# Patient Record
Sex: FEMALE | Race: BLACK OR AFRICAN AMERICAN
[De-identification: names, ages, dates, MRNs, and addresses within clinical notes are randomized per-mention and may not be internally consistent; named-entity substitution may affect disease eponyms.]

---

## 2017-03-21 ENCOUNTER — APPOINTMENT (OUTPATIENT)
Dept: FAMILY MEDICINE | Facility: CLINIC | Age: 57
End: 2017-03-21

## 2017-03-21 VITALS
BODY MASS INDEX: 26.93 KG/M2 | WEIGHT: 152 LBS | HEIGHT: 63 IN | SYSTOLIC BLOOD PRESSURE: 120 MMHG | DIASTOLIC BLOOD PRESSURE: 80 MMHG

## 2017-03-21 RX ORDER — SODIUM PICOSULFATE, MAGNESIUM OXIDE, AND ANHYDROUS CITRIC ACID 10; 3.5; 12 MG/16.2G; G/16.2G; G/16.2G
10-3.5-12 POWDER, METERED ORAL
Qty: 2 | Refills: 0 | Status: DISCONTINUED | COMMUNITY
Start: 2017-02-10

## 2017-03-21 RX ORDER — RIFAXIMIN 550 MG/1
550 TABLET ORAL
Qty: 30 | Refills: 0 | Status: DISCONTINUED | COMMUNITY
Start: 2017-02-10

## 2017-03-29 ENCOUNTER — MEDICATION RENEWAL (OUTPATIENT)
Age: 57
End: 2017-03-29

## 2017-05-15 ENCOUNTER — MEDICATION RENEWAL (OUTPATIENT)
Age: 57
End: 2017-05-15

## 2017-05-22 ENCOUNTER — MEDICATION RENEWAL (OUTPATIENT)
Age: 57
End: 2017-05-22

## 2017-10-31 ENCOUNTER — RX RENEWAL (OUTPATIENT)
Age: 57
End: 2017-10-31

## 2017-12-06 ENCOUNTER — APPOINTMENT (OUTPATIENT)
Dept: FAMILY MEDICINE | Facility: CLINIC | Age: 57
End: 2017-12-06
Payer: COMMERCIAL

## 2017-12-06 ENCOUNTER — LABORATORY RESULT (OUTPATIENT)
Age: 57
End: 2017-12-06

## 2017-12-06 VITALS
DIASTOLIC BLOOD PRESSURE: 80 MMHG | BODY MASS INDEX: 28.35 KG/M2 | WEIGHT: 160 LBS | SYSTOLIC BLOOD PRESSURE: 128 MMHG | HEIGHT: 63 IN

## 2017-12-06 PROCEDURE — 99213 OFFICE O/P EST LOW 20 MIN: CPT | Mod: 25

## 2017-12-06 PROCEDURE — 36415 COLL VENOUS BLD VENIPUNCTURE: CPT

## 2017-12-06 RX ORDER — AZITHROMYCIN 250 MG/1
250 TABLET, FILM COATED ORAL
Qty: 1 | Refills: 0 | Status: DISCONTINUED | COMMUNITY
Start: 2017-03-21 | End: 2017-12-06

## 2017-12-06 RX ORDER — PREDNISONE 20 MG/1
20 TABLET ORAL
Qty: 12 | Refills: 0 | Status: DISCONTINUED | COMMUNITY
Start: 2017-03-21 | End: 2017-12-06

## 2017-12-11 LAB
ALBUMIN SERPL ELPH-MCNC: 4 G/DL
ALP BLD-CCNC: 88 U/L
ALT SERPL-CCNC: 14 U/L
ANION GAP SERPL CALC-SCNC: 17 MMOL/L
AST SERPL-CCNC: 13 U/L
BASOPHILS # BLD AUTO: 0.05 K/UL
BASOPHILS NFR BLD AUTO: 0.9 %
BILIRUB SERPL-MCNC: 0.6 MG/DL
BUN SERPL-MCNC: 14 MG/DL
CALCIUM SERPL-MCNC: 10.2 MG/DL
CHLORIDE SERPL-SCNC: 101 MMOL/L
CO2 SERPL-SCNC: 25 MMOL/L
CREAT SERPL-MCNC: 0.9 MG/DL
EOSINOPHIL # BLD AUTO: 0.35 K/UL
EOSINOPHIL NFR BLD AUTO: 6.5 %
GLUCOSE SERPL-MCNC: 156 MG/DL
HCT VFR BLD CALC: 40.7 %
HGB BLD-MCNC: 13.7 G/DL
IMM GRANULOCYTES NFR BLD AUTO: 0 %
LYMPHOCYTES # BLD AUTO: 1.95 K/UL
LYMPHOCYTES NFR BLD AUTO: 36.2 %
MAN DIFF?: NORMAL
MCHC RBC-ENTMCNC: 30.4 PG
MCHC RBC-ENTMCNC: 33.7 GM/DL
MCV RBC AUTO: 90.4 FL
MONOCYTES # BLD AUTO: 0.24 K/UL
MONOCYTES NFR BLD AUTO: 4.5 %
NEUTROPHILS # BLD AUTO: 2.8 K/UL
NEUTROPHILS NFR BLD AUTO: 51.9 %
PLATELET # BLD AUTO: 268 K/UL
POTASSIUM SERPL-SCNC: 3.7 MMOL/L
PROT SERPL-MCNC: 7.5 G/DL
RBC # BLD: 4.5 M/UL
RBC # FLD: 12.9 %
SODIUM SERPL-SCNC: 143 MMOL/L
T3FREE SERPL-MCNC: 2.97 PG/ML
T4 FREE SERPL-MCNC: 1.2 NG/DL
TSH SERPL-ACNC: 1.23 UIU/ML
WBC # FLD AUTO: 5.39 K/UL

## 2017-12-29 ENCOUNTER — MEDICATION RENEWAL (OUTPATIENT)
Age: 57
End: 2017-12-29

## 2018-06-11 ENCOUNTER — APPOINTMENT (OUTPATIENT)
Dept: FAMILY MEDICINE | Facility: CLINIC | Age: 58
End: 2018-06-11
Payer: COMMERCIAL

## 2018-06-11 VITALS
HEART RATE: 79 BPM | WEIGHT: 161.44 LBS | TEMPERATURE: 98 F | HEIGHT: 63 IN | DIASTOLIC BLOOD PRESSURE: 80 MMHG | OXYGEN SATURATION: 96 % | SYSTOLIC BLOOD PRESSURE: 130 MMHG | BODY MASS INDEX: 28.61 KG/M2

## 2018-06-11 DIAGNOSIS — Z86.39 PERSONAL HISTORY OF OTHER ENDOCRINE, NUTRITIONAL AND METABOLIC DISEASE: ICD-10-CM

## 2018-06-11 PROCEDURE — 99214 OFFICE O/P EST MOD 30 MIN: CPT | Mod: 25

## 2018-06-11 PROCEDURE — 36415 COLL VENOUS BLD VENIPUNCTURE: CPT

## 2018-06-11 NOTE — PHYSICAL EXAM
[Well Nourished] : well nourished [Supple] : supple [Clear to Auscultation] : lungs were clear to auscultation bilaterally [Regular Rhythm] : with a regular rhythm [No Abdominal Bruit] : a ~M bruit was not heard ~T in the abdomen [Non Tender] : non-tender [No Joint Swelling] : no joint swelling [No Rash] : no rash [Normal Gait] : normal gait [Normal Insight/Judgement] : insight and judgment were intact

## 2018-06-11 NOTE — HISTORY OF PRESENT ILLNESS
[FreeTextEntry1] : fu [de-identified] : 58 year old female is here for a followup visit. Patient is here for medication renewals and for blood work discussion. Medications and allergies were reviewed and assessed.  There has been no new medications since the last visit. Patient is feeling well with no active changes or issues since Her last visit.\par

## 2018-06-11 NOTE — HEALTH RISK ASSESSMENT
[] : No [No falls in past year] : Patient reported no falls in the past year [0] : 2) Feeling down, depressed, or hopeless: Not at all (0) [[id number]] : 0

## 2018-06-11 NOTE — ASSESSMENT
[FreeTextEntry1] : The patient has a diagnosis of hypertension. Blood work was drawn in office and will be followed.  The diagnosis was discussed with patient and need for medication compliance and possible side affects and risks of noncompliance. Patient was told to adhere to a low salt diet and try to incorporate exercise daily.\par \par Patient had a diagnosis of thyroid disorder. Blood was drawn to assess levels of TSH and T4. Patient will continue on current dose of medications at this time unless instructed otherwise. Patient was advised to take thyroid medications on a empty stomach and to wait at least 45 minutes before eating for appropriate absorption.\par

## 2018-06-12 PROBLEM — Z86.39 HISTORY OF HIGH CHOLESTEROL: Status: RESOLVED | Noted: 2017-05-15 | Resolved: 2018-06-12

## 2018-06-12 LAB
ALBUMIN SERPL ELPH-MCNC: 4.4 G/DL
ALP BLD-CCNC: 88 U/L
ALT SERPL-CCNC: 18 U/L
ANION GAP SERPL CALC-SCNC: 16 MMOL/L
AST SERPL-CCNC: 17 U/L
BASOPHILS # BLD AUTO: 0.02 K/UL
BASOPHILS NFR BLD AUTO: 0.3 %
BILIRUB SERPL-MCNC: 0.8 MG/DL
BUN SERPL-MCNC: 14 MG/DL
CALCIUM SERPL-MCNC: 10.1 MG/DL
CHLORIDE SERPL-SCNC: 98 MMOL/L
CHOLEST SERPL-MCNC: 296 MG/DL
CHOLEST/HDLC SERPL: 3.1 RATIO
CO2 SERPL-SCNC: 27 MMOL/L
CREAT SERPL-MCNC: 1 MG/DL
EOSINOPHIL # BLD AUTO: 0.19 K/UL
EOSINOPHIL NFR BLD AUTO: 3.3 %
ESTRADIOL SERPL-MCNC: <5 PG/ML
FSH SERPL-MCNC: 60.4 IU/L
GLUCOSE SERPL-MCNC: 110 MG/DL
HBA1C MFR BLD HPLC: 5 %
HCT VFR BLD CALC: 42.4 %
HDLC SERPL-MCNC: 97 MG/DL
HGB BLD-MCNC: 13.8 G/DL
IMM GRANULOCYTES NFR BLD AUTO: 0.2 %
LDLC SERPL CALC-MCNC: 165 MG/DL
LYMPHOCYTES # BLD AUTO: 2.24 K/UL
LYMPHOCYTES NFR BLD AUTO: 39.1 %
MAN DIFF?: NORMAL
MCHC RBC-ENTMCNC: 30.3 PG
MCHC RBC-ENTMCNC: 32.5 GM/DL
MCV RBC AUTO: 93 FL
MONOCYTES # BLD AUTO: 0.29 K/UL
MONOCYTES NFR BLD AUTO: 5.1 %
NEUTROPHILS # BLD AUTO: 2.98 K/UL
NEUTROPHILS NFR BLD AUTO: 52 %
PLATELET # BLD AUTO: 278 K/UL
POTASSIUM SERPL-SCNC: 4 MMOL/L
PROT SERPL-MCNC: 7.7 G/DL
RBC # BLD: 4.56 M/UL
RBC # FLD: 13.4 %
SODIUM SERPL-SCNC: 141 MMOL/L
T4 FREE SERPL-MCNC: 1.2 NG/DL
TRIGL SERPL-MCNC: 172 MG/DL
TSH SERPL-ACNC: 1.52 UIU/ML
WBC # FLD AUTO: 5.73 K/UL

## 2018-09-30 ENCOUNTER — APPOINTMENT (OUTPATIENT)
Dept: FAMILY MEDICINE | Facility: CLINIC | Age: 58
End: 2018-09-30
Payer: COMMERCIAL

## 2018-09-30 VITALS
WEIGHT: 164 LBS | HEART RATE: 84 BPM | SYSTOLIC BLOOD PRESSURE: 122 MMHG | BODY MASS INDEX: 29.06 KG/M2 | DIASTOLIC BLOOD PRESSURE: 80 MMHG | OXYGEN SATURATION: 94 % | HEIGHT: 63 IN

## 2018-09-30 PROCEDURE — 99213 OFFICE O/P EST LOW 20 MIN: CPT

## 2018-10-30 ENCOUNTER — APPOINTMENT (OUTPATIENT)
Dept: FAMILY MEDICINE | Facility: CLINIC | Age: 58
End: 2018-10-30
Payer: COMMERCIAL

## 2018-10-30 VITALS
BODY MASS INDEX: 29.23 KG/M2 | HEIGHT: 63 IN | WEIGHT: 165 LBS | HEART RATE: 61 BPM | OXYGEN SATURATION: 98 % | DIASTOLIC BLOOD PRESSURE: 82 MMHG | RESPIRATION RATE: 16 BRPM | SYSTOLIC BLOOD PRESSURE: 136 MMHG

## 2018-10-30 DIAGNOSIS — J06.9 ACUTE UPPER RESPIRATORY INFECTION, UNSPECIFIED: ICD-10-CM

## 2018-10-30 PROCEDURE — 99213 OFFICE O/P EST LOW 20 MIN: CPT

## 2018-10-30 NOTE — PHYSICAL EXAM
[Well Nourished] : well nourished [Supple] : supple [Clear to Auscultation] : lungs were clear to auscultation bilaterally [Regular Rhythm] : with a regular rhythm [No Abdominal Bruit] : a ~M bruit was not heard ~T in the abdomen [Non Tender] : non-tender [No Joint Swelling] : no joint swelling [No Rash] : no rash [Normal Gait] : normal gait [Normal Insight/Judgement] : insight and judgment were intact [de-identified] : c

## 2018-10-30 NOTE — HISTORY OF PRESENT ILLNESS
[FreeTextEntry8] : 58 year old female here with complaints of low grade fever that stopped two days ago, now has uncontrollable coughing.  has been sick for a week. Patients active medications, allergies and issues were all reviewed with the patient at time of visit.\par

## 2018-10-30 NOTE — HEALTH RISK ASSESSMENT
[] : No [No falls in past year] : Patient reported no falls in the past year [0] : 2) Feeling down, depressed, or hopeless: Not at all (0) [[id number]] : 0

## 2018-12-11 ENCOUNTER — RX RENEWAL (OUTPATIENT)
Age: 58
End: 2018-12-11

## 2019-01-17 ENCOUNTER — RX RENEWAL (OUTPATIENT)
Age: 59
End: 2019-01-17

## 2019-02-15 ENCOUNTER — APPOINTMENT (OUTPATIENT)
Dept: FAMILY MEDICINE | Facility: CLINIC | Age: 59
End: 2019-02-15
Payer: COMMERCIAL

## 2019-02-15 VITALS
HEART RATE: 55 BPM | DIASTOLIC BLOOD PRESSURE: 80 MMHG | TEMPERATURE: 98.1 F | WEIGHT: 162.5 LBS | HEIGHT: 63 IN | BODY MASS INDEX: 28.79 KG/M2 | SYSTOLIC BLOOD PRESSURE: 130 MMHG

## 2019-02-15 PROCEDURE — 99214 OFFICE O/P EST MOD 30 MIN: CPT | Mod: 25

## 2019-02-15 PROCEDURE — 36415 COLL VENOUS BLD VENIPUNCTURE: CPT

## 2019-02-15 RX ORDER — PROMETHAZINE HYDROCHLORIDE AND DEXTROMETHORPHAN HYDROBROMIDE ORAL SOLUTION 15; 6.25 MG/5ML; MG/5ML
6.25-15 SOLUTION ORAL
Qty: 150 | Refills: 0 | Status: DISCONTINUED | COMMUNITY
Start: 2018-10-30 | End: 2019-02-15

## 2019-02-15 RX ORDER — PREDNISONE 20 MG/1
20 TABLET ORAL
Qty: 15 | Refills: 0 | Status: DISCONTINUED | COMMUNITY
Start: 2018-10-30 | End: 2019-02-15

## 2019-02-15 RX ORDER — ATORVASTATIN CALCIUM 10 MG/1
10 TABLET, FILM COATED ORAL
Qty: 90 | Refills: 0 | Status: DISCONTINUED | COMMUNITY
Start: 2018-06-12 | End: 2019-02-15

## 2019-02-15 NOTE — HISTORY OF PRESENT ILLNESS
[FreeTextEntry1] : fu [de-identified] : 58 year old female is here for a followup visit. Patient is here for medication renewals and for blood work discussion. Medications and allergies were reviewed and assessed.  There has been no new medications since the last visit. Patient is feeling well with no active changes or issues since Her last visit.\par patient wishes to have a discussion about her blood pressure

## 2019-02-15 NOTE — HEALTH RISK ASSESSMENT
[] : No [No falls in past year] : Patient reported no falls in the past year [0] : 2) Feeling down, depressed, or hopeless: Not at all (0) [[id number]] : 0

## 2019-02-15 NOTE — ASSESSMENT
[FreeTextEntry1] : The patient has a diagnosis of hypertension. Blood work was drawn in office and will be followed.  The diagnosis was discussed with patient and need for medication compliance and possible side affects and risks of noncompliance. Patient was told to adhere to a low salt diet and try to incorporate exercise daily.\par \par Patient had a diagnosis of thyroid disorder. Blood was drawn to assess levels of TSH and T4. Patient will continue on current dose of medications at this time unless instructed otherwise. Patient was advised to take thyroid medications on a empty stomach and to wait at least 45 minutes before eating for appropriate absorption.\par \par The diagnosis of high cholesterol is established and patient is currently taking medications. Blood work was drawn in office and results will be reviewed and followed. The patient was counseled on a low cholesterol diet and on medication compliance. Patient was advised to generally limit foods fried in oil, high in saturated fat, cheese, eggs and red meat. Patient was advised to continue on current medications and to followup in office for necessary blood work in three months.\par patient failed atorvastatin - feels bad, loss of hair, body aches, switch to crestor\par \par

## 2019-02-18 ENCOUNTER — MEDICATION RENEWAL (OUTPATIENT)
Age: 59
End: 2019-02-18

## 2019-02-19 ENCOUNTER — MEDICATION RENEWAL (OUTPATIENT)
Age: 59
End: 2019-02-19

## 2019-02-25 ENCOUNTER — MEDICATION RENEWAL (OUTPATIENT)
Age: 59
End: 2019-02-25

## 2019-06-30 ENCOUNTER — APPOINTMENT (OUTPATIENT)
Dept: FAMILY MEDICINE | Facility: CLINIC | Age: 59
End: 2019-06-30
Payer: COMMERCIAL

## 2019-06-30 VITALS
BODY MASS INDEX: 28.17 KG/M2 | WEIGHT: 159 LBS | HEIGHT: 63 IN | SYSTOLIC BLOOD PRESSURE: 138 MMHG | DIASTOLIC BLOOD PRESSURE: 82 MMHG

## 2019-06-30 PROCEDURE — 93000 ELECTROCARDIOGRAM COMPLETE: CPT

## 2019-06-30 PROCEDURE — 99213 OFFICE O/P EST LOW 20 MIN: CPT | Mod: 25

## 2019-07-02 ENCOUNTER — APPOINTMENT (OUTPATIENT)
Dept: CARDIOLOGY | Facility: CLINIC | Age: 59
End: 2019-07-02

## 2019-08-28 ENCOUNTER — APPOINTMENT (OUTPATIENT)
Dept: FAMILY MEDICINE | Facility: CLINIC | Age: 59
End: 2019-08-28
Payer: COMMERCIAL

## 2019-08-28 VITALS — BODY MASS INDEX: 28.59 KG/M2 | WEIGHT: 161.4 LBS

## 2019-08-28 VITALS — DIASTOLIC BLOOD PRESSURE: 78 MMHG | HEIGHT: 63 IN | SYSTOLIC BLOOD PRESSURE: 132 MMHG

## 2019-08-28 PROCEDURE — 99214 OFFICE O/P EST MOD 30 MIN: CPT | Mod: 25

## 2019-08-28 PROCEDURE — 36415 COLL VENOUS BLD VENIPUNCTURE: CPT

## 2019-08-28 RX ORDER — ALBUTEROL SULFATE 90 UG/1
108 (90 BASE) AEROSOL, METERED RESPIRATORY (INHALATION)
Qty: 3 | Refills: 0 | Status: DISCONTINUED | COMMUNITY
Start: 2019-02-18 | End: 2019-08-28

## 2019-08-28 RX ORDER — METRONIDAZOLE 500 MG/1
500 TABLET ORAL
Qty: 42 | Refills: 0 | Status: DISCONTINUED | COMMUNITY
Start: 2019-07-26

## 2019-08-28 RX ORDER — CIPROFLOXACIN HYDROCHLORIDE 500 MG/1
500 TABLET, FILM COATED ORAL
Qty: 28 | Refills: 0 | Status: DISCONTINUED | COMMUNITY
Start: 2019-07-26

## 2019-08-28 RX ORDER — BUDESONIDE 180 UG/1
180 AEROSOL, POWDER RESPIRATORY (INHALATION)
Qty: 3 | Refills: 0 | Status: DISCONTINUED | COMMUNITY
Start: 2019-02-25 | End: 2019-08-28

## 2019-08-28 NOTE — PHYSICAL EXAM
[Normal Oropharynx] : the oropharynx was normal [No Lymphadenopathy] : no lymphadenopathy [Supple] : supple [Thyroid Normal, No Nodules] : the thyroid was normal and there were no nodules present [No Edema] : there was no peripheral edema [No Extremity Clubbing/Cyanosis] : no extremity clubbing/cyanosis [Normal] : affect was normal and insight and judgment were intact

## 2019-08-28 NOTE — PLAN
[FreeTextEntry1] : Will follow up labwork drawn in office today.\par \par Patient has GI appt and will be making Cardiology appointment. Asked to have notes from specialists sent to our office. \par \par \par

## 2019-08-28 NOTE — HISTORY OF PRESENT ILLNESS
[FreeTextEntry1] : Here for follow-up, HTN, HLD [de-identified] : Here for follow-up, HTN, HLD.\par \par Dr. Hood-Gastro for stomach issues.  Xifaxan; bad reaction to Cipro/Flagyl. \par Follow-up with Gastro next week.  Bad diarrhea last week.  \par \par Cardio-needs to make appointment. \par \par Takes claritin as needed.\par Medications and allergies reviewed.\par \par \par \par

## 2019-08-29 LAB
ALBUMIN SERPL ELPH-MCNC: 4.3 G/DL
ALP BLD-CCNC: 89 U/L
ALT SERPL-CCNC: 23 U/L
ANION GAP SERPL CALC-SCNC: 13 MMOL/L
AST SERPL-CCNC: 21 U/L
BASOPHILS # BLD AUTO: 0.07 K/UL
BASOPHILS NFR BLD AUTO: 1 %
BILIRUB SERPL-MCNC: 0.4 MG/DL
BUN SERPL-MCNC: 13 MG/DL
CALCIUM SERPL-MCNC: 9.7 MG/DL
CHLORIDE SERPL-SCNC: 100 MMOL/L
CHOLEST SERPL-MCNC: 220 MG/DL
CHOLEST/HDLC SERPL: 2.7 RATIO
CO2 SERPL-SCNC: 26 MMOL/L
CREAT SERPL-MCNC: 0.7 MG/DL
EOSINOPHIL # BLD AUTO: 0.32 K/UL
EOSINOPHIL NFR BLD AUTO: 4.6 %
ESTIMATED AVERAGE GLUCOSE: 94 MG/DL
GLUCOSE SERPL-MCNC: 107 MG/DL
HBA1C MFR BLD HPLC: 4.9 %
HCT VFR BLD CALC: 41.5 %
HDLC SERPL-MCNC: 81 MG/DL
HGB BLD-MCNC: 13.9 G/DL
IMM GRANULOCYTES NFR BLD AUTO: 0.3 %
LDLC SERPL CALC-MCNC: 118 MG/DL
LYMPHOCYTES # BLD AUTO: 2.84 K/UL
LYMPHOCYTES NFR BLD AUTO: 40.4 %
MAN DIFF?: NORMAL
MCHC RBC-ENTMCNC: 30.5 PG
MCHC RBC-ENTMCNC: 33.5 GM/DL
MCV RBC AUTO: 91 FL
MONOCYTES # BLD AUTO: 0.49 K/UL
MONOCYTES NFR BLD AUTO: 7 %
NEUTROPHILS # BLD AUTO: 3.29 K/UL
NEUTROPHILS NFR BLD AUTO: 46.7 %
PLATELET # BLD AUTO: 247 K/UL
POTASSIUM SERPL-SCNC: 3.5 MMOL/L
PROT SERPL-MCNC: 7.3 G/DL
RBC # BLD: 4.56 M/UL
RBC # FLD: 12.1 %
SODIUM SERPL-SCNC: 139 MMOL/L
TRIGL SERPL-MCNC: 103 MG/DL
TSH SERPL-ACNC: 1.48 UIU/ML
WBC # FLD AUTO: 7.03 K/UL

## 2019-11-04 ENCOUNTER — RESULT REVIEW (OUTPATIENT)
Age: 59
End: 2019-11-04

## 2020-02-03 ENCOUNTER — RX RENEWAL (OUTPATIENT)
Age: 60
End: 2020-02-03

## 2020-03-13 ENCOUNTER — APPOINTMENT (OUTPATIENT)
Dept: FAMILY MEDICINE | Facility: CLINIC | Age: 60
End: 2020-03-13
Payer: COMMERCIAL

## 2020-03-13 VITALS — SYSTOLIC BLOOD PRESSURE: 128 MMHG | DIASTOLIC BLOOD PRESSURE: 84 MMHG

## 2020-03-13 VITALS
DIASTOLIC BLOOD PRESSURE: 90 MMHG | HEIGHT: 63 IN | HEART RATE: 58 BPM | SYSTOLIC BLOOD PRESSURE: 126 MMHG | OXYGEN SATURATION: 97 % | TEMPERATURE: 97.6 F

## 2020-03-13 LAB
ALBUMIN SERPL ELPH-MCNC: 4.4 G/DL
ALP BLD-CCNC: 86 U/L
ALT SERPL-CCNC: 21 U/L
ANION GAP SERPL CALC-SCNC: 14 MMOL/L
AST SERPL-CCNC: 16 U/L
BASOPHILS # BLD AUTO: 0.04 K/UL
BASOPHILS NFR BLD AUTO: 0.6 %
BILIRUB SERPL-MCNC: 0.6 MG/DL
BUN SERPL-MCNC: 16 MG/DL
CALCIUM SERPL-MCNC: 9.9 MG/DL
CHLORIDE SERPL-SCNC: 100 MMOL/L
CHOLEST SERPL-MCNC: 270 MG/DL
CHOLEST/HDLC SERPL: 2.8 RATIO
CO2 SERPL-SCNC: 29 MMOL/L
CREAT SERPL-MCNC: 0.75 MG/DL
EOSINOPHIL # BLD AUTO: 0.15 K/UL
EOSINOPHIL NFR BLD AUTO: 2.3 %
ESTIMATED AVERAGE GLUCOSE: 100 MG/DL
GLUCOSE SERPL-MCNC: 114 MG/DL
HBA1C MFR BLD HPLC: 5.1 %
HCT VFR BLD CALC: 43 %
HDLC SERPL-MCNC: 96 MG/DL
HGB BLD-MCNC: 13.7 G/DL
IMM GRANULOCYTES NFR BLD AUTO: 0.3 %
LDLC SERPL CALC-MCNC: 151 MG/DL
LYMPHOCYTES # BLD AUTO: 2.25 K/UL
LYMPHOCYTES NFR BLD AUTO: 35.2 %
MAN DIFF?: NORMAL
MCHC RBC-ENTMCNC: 29.4 PG
MCHC RBC-ENTMCNC: 31.9 GM/DL
MCV RBC AUTO: 92.3 FL
MONOCYTES # BLD AUTO: 0.41 K/UL
MONOCYTES NFR BLD AUTO: 6.4 %
NEUTROPHILS # BLD AUTO: 3.53 K/UL
NEUTROPHILS NFR BLD AUTO: 55.2 %
PLATELET # BLD AUTO: 284 K/UL
POTASSIUM SERPL-SCNC: 3.3 MMOL/L
PROT SERPL-MCNC: 7.3 G/DL
RBC # BLD: 4.66 M/UL
RBC # FLD: 12.6 %
SODIUM SERPL-SCNC: 142 MMOL/L
TRIGL SERPL-MCNC: 111 MG/DL
TSH SERPL-ACNC: 0.88 UIU/ML
WBC # FLD AUTO: 6.4 K/UL

## 2020-03-13 PROCEDURE — 99214 OFFICE O/P EST MOD 30 MIN: CPT | Mod: 25

## 2020-03-13 PROCEDURE — 36415 COLL VENOUS BLD VENIPUNCTURE: CPT

## 2020-03-13 NOTE — PHYSICAL EXAM
[Normal Oropharynx] : the oropharynx was normal [No Edema] : there was no peripheral edema [No Extremity Clubbing/Cyanosis] : no extremity clubbing/cyanosis [Normal] : affect was normal and insight and judgment were intact [de-identified] : Nontender-no ecchymosis, no erythema.  ROM intact.  Dorsflexion and plantar flexion intact b/l feet.  Bunions.

## 2020-03-13 NOTE — PLAN
[FreeTextEntry1] : Will follow up labwork drawn in office today.\par \par Restart Singulair. \par Will send for Xray to evaluate foot pain, no redness no bruising. \par Podiatry for further follow-up. \par Advised any worsening pain or symptoms requires medical re-evaluation.

## 2020-03-13 NOTE — HISTORY OF PRESENT ILLNESS
[FreeTextEntry1] : Here for follow-up [de-identified] : Here for follow-up\par Lactose intolerance-GI Dr. Hood 12/19-[place]\par Gyn-12/19-had mammo and pap smear. Menopause mid 50s. \par \par Needs med refills. \par Has been watching diet. \par \par Can fell on left foot in November-after thanksgiving. No bruising.  Pain with activity some days-did not try anything for pain, no heat no ice. \par Was not evaluated when it happened, has been dealing with it. \par Using rescue inhaler more frequently. Has not been using allergy medication.  \par Usually has worse allergies in the spring. \par

## 2020-03-13 NOTE — REVIEW OF SYSTEMS
[Negative] : Neurological [Wheezing] : no wheezing [Cough] : no cough [FreeTextEntry9] : top of left foot pain

## 2020-05-28 ENCOUNTER — APPOINTMENT (OUTPATIENT)
Dept: FAMILY MEDICINE | Facility: CLINIC | Age: 60
End: 2020-05-28
Payer: COMMERCIAL

## 2020-05-28 ENCOUNTER — NON-APPOINTMENT (OUTPATIENT)
Age: 60
End: 2020-05-28

## 2020-05-28 VITALS
OXYGEN SATURATION: 97 % | SYSTOLIC BLOOD PRESSURE: 126 MMHG | BODY MASS INDEX: 27.46 KG/M2 | HEART RATE: 69 BPM | HEIGHT: 63 IN | WEIGHT: 155 LBS | TEMPERATURE: 97.7 F | DIASTOLIC BLOOD PRESSURE: 76 MMHG

## 2020-05-28 DIAGNOSIS — R53.81 OTHER MALAISE: ICD-10-CM

## 2020-05-28 PROCEDURE — 36415 COLL VENOUS BLD VENIPUNCTURE: CPT

## 2020-05-28 PROCEDURE — 93000 ELECTROCARDIOGRAM COMPLETE: CPT

## 2020-05-28 PROCEDURE — 99214 OFFICE O/P EST MOD 30 MIN: CPT | Mod: 25

## 2020-05-28 NOTE — REVIEW OF SYSTEMS
[Negative] : Psychiatric [Chest Pain] : no chest pain [Palpitations] : no palpitations [FreeTextEntry9] : back pain

## 2020-05-28 NOTE — PLAN
[FreeTextEntry1] : Will follow up labwork drawn in office today.\par \par Can trial anti-inflammatory for muscle tenderness as needed. MSK component with reproducible tenderness.  \par EKG reviewed-sinus trace similar to prior.  Recommending Cardiology eval given ongoing duration of symptoms since last summer. \par \par Discussed with Ms. HI precautions and advised to go to Emergency Room if condition worsened.  MsStef CORTEZMILTON HI expressed understanding of the plan.\par

## 2020-05-28 NOTE — PHYSICAL EXAM
[Normal Oropharynx] : the oropharynx was normal [No Lymphadenopathy] : no lymphadenopathy [Supple] : supple [Normal] : affect was normal and insight and judgment were intact [de-identified] : anterior chest wall tenderness to palpation [de-identified] : upper thoracic paraspinal tenderness

## 2020-05-29 LAB
ALBUMIN SERPL ELPH-MCNC: 4.2 G/DL
ALP BLD-CCNC: 82 U/L
ALT SERPL-CCNC: 25 U/L
ANION GAP SERPL CALC-SCNC: 14 MMOL/L
AST SERPL-CCNC: 18 U/L
BILIRUB SERPL-MCNC: 0.6 MG/DL
BUN SERPL-MCNC: 12 MG/DL
CALCIUM SERPL-MCNC: 9.9 MG/DL
CHLORIDE SERPL-SCNC: 100 MMOL/L
CHOLEST SERPL-MCNC: 182 MG/DL
CHOLEST/HDLC SERPL: 2.1 RATIO
CO2 SERPL-SCNC: 29 MMOL/L
CREAT SERPL-MCNC: 0.75 MG/DL
GLUCOSE SERPL-MCNC: 105 MG/DL
HDLC SERPL-MCNC: 86 MG/DL
LDLC SERPL CALC-MCNC: 71 MG/DL
POTASSIUM SERPL-SCNC: 3.6 MMOL/L
PROT SERPL-MCNC: 7 G/DL
SODIUM SERPL-SCNC: 142 MMOL/L
TRIGL SERPL-MCNC: 127 MG/DL
TSH SERPL-ACNC: 0.99 UIU/ML

## 2020-06-19 ENCOUNTER — APPOINTMENT (OUTPATIENT)
Dept: FAMILY MEDICINE | Facility: CLINIC | Age: 60
End: 2020-06-19

## 2020-11-23 ENCOUNTER — RX RENEWAL (OUTPATIENT)
Age: 60
End: 2020-11-23

## 2020-11-30 ENCOUNTER — APPOINTMENT (OUTPATIENT)
Dept: FAMILY MEDICINE | Facility: CLINIC | Age: 60
End: 2020-11-30
Payer: COMMERCIAL

## 2020-11-30 ENCOUNTER — LABORATORY RESULT (OUTPATIENT)
Age: 60
End: 2020-11-30

## 2020-11-30 VITALS
BODY MASS INDEX: 27.35 KG/M2 | SYSTOLIC BLOOD PRESSURE: 138 MMHG | OXYGEN SATURATION: 97 % | HEART RATE: 81 BPM | RESPIRATION RATE: 16 BRPM | WEIGHT: 154.38 LBS | DIASTOLIC BLOOD PRESSURE: 90 MMHG

## 2020-11-30 PROCEDURE — 36415 COLL VENOUS BLD VENIPUNCTURE: CPT

## 2020-11-30 PROCEDURE — 99396 PREV VISIT EST AGE 40-64: CPT | Mod: 25

## 2020-11-30 NOTE — HEALTH RISK ASSESSMENT
[Excellent] : ~his/her~  mood as  excellent [] : No [Yes] : Yes [No falls in past year] : Patient reported no falls in the past year [0] : 2) Feeling down, depressed, or hopeless: Not at all (0) [[id number]] : 0 [Patient reported mammogram was normal] : Patient reported mammogram was normal [Patient reported colonoscopy was normal] : Patient reported colonoscopy was normal [With Significant Other] : lives with significant other [Employed] : employed [] :  [# Of Children ___] : has [unfilled] children [Fully functional (bathing, dressing, toileting, transferring, walking, feeding)] : Fully functional (bathing, dressing, toileting, transferring, walking, feeding) [Fully functional (using the telephone, shopping, preparing meals, housekeeping, doing laundry, using] : Fully functional and needs no help or supervision to perform IADLs (using the telephone, shopping, preparing meals, housekeeping, doing laundry, using transportation, managing medications and managing finances) [MammogramDate] : 11/2019 [ColonoscopyDate] : 11/2020 [de-identified] : [facility]

## 2020-11-30 NOTE — PHYSICAL EXAM
[Well Nourished] : well nourished [Well Developed] : well developed [Clear to Auscultation] : lungs were clear to auscultation bilaterally [Regular Rhythm] : with a regular rhythm [Non Tender] : non-tender [No Joint Swelling] : no joint swelling [No Rash] : no rash [Normal Gait] : normal gait [Normal Affect] : the affect was normal [Normal Insight/Judgement] : insight and judgment were intact

## 2020-11-30 NOTE — ASSESSMENT
[FreeTextEntry1] : hypertension\par The patient has a diagnosis of hypertension. Blood work was drawn in office and will be followed.  The diagnosis was discussed with patient and need for medication compliance and possible side affects and risks of noncompliance. Patient was told to adhere to a low salt diet and try to incorporate exercise daily.\par \par thyroid\par Patient had a diagnosis of thyroid disorder. Blood was drawn to assess levels of TSH and T4. Patient will continue on current dose of medications at this time unless instructed otherwise. Patient was advised to take thyroid medications on a empty stomach and to wait at least 45 minutes before eating for appropriate absorption.\par \par cholesterol\par The diagnosis of high cholesterol is established and patient is currently taking medications. Blood work was drawn in office and results will be reviewed and followed. The patient was counseled on a low cholesterol diet and on medication compliance. Patient was advised to generally limit foods fried in oil, high in saturated fat, cheese, eggs and red meat. Patient was advised to continue on current medications and to followup in office for necessary blood work in three months.\par patient failed atorvastatin - feels bad, loss of hair, body aches, doing better on crestor\par \par

## 2020-11-30 NOTE — HISTORY OF PRESENT ILLNESS
[de-identified] : 60 year old female  here for annual well visit. Patient's blood work was drawn and medications reviewed. Patient's past medical history was reviewed, allergies verified and problems were identified and assessed. Patients medications were reviewed. Patient is feeling well with no new or active complaints at this time.\par

## 2020-12-01 LAB
ALBUMIN SERPL ELPH-MCNC: 4.8 G/DL
ALP BLD-CCNC: 94 U/L
ALT SERPL-CCNC: 21 U/L
ANION GAP SERPL CALC-SCNC: 13 MMOL/L
APPEARANCE: CLEAR
AST SERPL-CCNC: 18 U/L
BASOPHILS # BLD AUTO: 0.05 K/UL
BASOPHILS NFR BLD AUTO: 0.7 %
BILIRUB SERPL-MCNC: 0.6 MG/DL
BILIRUBIN URINE: NEGATIVE
BLOOD URINE: NEGATIVE
BUN SERPL-MCNC: 18 MG/DL
CALCIUM SERPL-MCNC: 10.3 MG/DL
CHLORIDE SERPL-SCNC: 97 MMOL/L
CHOLEST SERPL-MCNC: 279 MG/DL
CO2 SERPL-SCNC: 28 MMOL/L
COLOR: YELLOW
CREAT SERPL-MCNC: 0.77 MG/DL
EOSINOPHIL # BLD AUTO: 0.15 K/UL
EOSINOPHIL NFR BLD AUTO: 2.1 %
ESTIMATED AVERAGE GLUCOSE: 100 MG/DL
GLUCOSE QUALITATIVE U: NEGATIVE
GLUCOSE SERPL-MCNC: 50 MG/DL
HBA1C MFR BLD HPLC: 5.1 %
HCT VFR BLD CALC: 44.2 %
HDLC SERPL-MCNC: 97 MG/DL
HGB BLD-MCNC: 14.4 G/DL
IMM GRANULOCYTES NFR BLD AUTO: 0.1 %
KETONES URINE: NORMAL
LDLC SERPL CALC-MCNC: 151 MG/DL
LEUKOCYTE ESTERASE URINE: ABNORMAL
LYMPHOCYTES # BLD AUTO: 2.85 K/UL
LYMPHOCYTES NFR BLD AUTO: 40.8 %
MAN DIFF?: NORMAL
MCHC RBC-ENTMCNC: 30.4 PG
MCHC RBC-ENTMCNC: 32.6 GM/DL
MCV RBC AUTO: 93.2 FL
MONOCYTES # BLD AUTO: 0.5 K/UL
MONOCYTES NFR BLD AUTO: 7.2 %
NEUTROPHILS # BLD AUTO: 3.42 K/UL
NEUTROPHILS NFR BLD AUTO: 49.1 %
NITRITE URINE: NEGATIVE
NONHDLC SERPL-MCNC: 182 MG/DL
PH URINE: 6
PLATELET # BLD AUTO: 316 K/UL
POTASSIUM SERPL-SCNC: 3.6 MMOL/L
PROT SERPL-MCNC: 7.9 G/DL
PROTEIN URINE: NORMAL
RBC # BLD: 4.74 M/UL
RBC # FLD: 12.6 %
SARS-COV-2 IGG SERPL IA-ACNC: 0.08 INDEX
SARS-COV-2 IGG SERPL QL IA: NEGATIVE
SODIUM SERPL-SCNC: 139 MMOL/L
SPECIFIC GRAVITY URINE: 1.03
TRIGL SERPL-MCNC: 152 MG/DL
TSH SERPL-ACNC: 1.43 UIU/ML
UROBILINOGEN URINE: ABNORMAL
WBC # FLD AUTO: 6.98 K/UL

## 2020-12-16 PROBLEM — J06.9 ACUTE URI: Status: RESOLVED | Noted: 2018-10-30 | Resolved: 2020-12-16

## 2021-06-06 ENCOUNTER — APPOINTMENT (OUTPATIENT)
Dept: FAMILY MEDICINE | Facility: CLINIC | Age: 61
End: 2021-06-06
Payer: COMMERCIAL

## 2021-06-06 VITALS
BODY MASS INDEX: 27.02 KG/M2 | DIASTOLIC BLOOD PRESSURE: 80 MMHG | HEIGHT: 63 IN | HEART RATE: 80 BPM | SYSTOLIC BLOOD PRESSURE: 110 MMHG | OXYGEN SATURATION: 97 % | WEIGHT: 152.5 LBS

## 2021-06-06 PROCEDURE — 99214 OFFICE O/P EST MOD 30 MIN: CPT | Mod: 25

## 2021-06-06 PROCEDURE — 36415 COLL VENOUS BLD VENIPUNCTURE: CPT

## 2021-06-06 PROCEDURE — 99072 ADDL SUPL MATRL&STAF TM PHE: CPT

## 2021-06-06 RX ORDER — METHYLPREDNISOLONE 4 MG/1
4 TABLET ORAL
Qty: 21 | Refills: 0 | Status: DISCONTINUED | COMMUNITY
Start: 2021-02-11

## 2021-06-07 LAB
AFP-TM SERPL-MCNC: 8.5 NG/ML
ALBUMIN SERPL ELPH-MCNC: 4.3 G/DL
ALP BLD-CCNC: 82 U/L
ALT SERPL-CCNC: 19 U/L
ANION GAP SERPL CALC-SCNC: 12 MMOL/L
AST SERPL-CCNC: 19 U/L
BILIRUB SERPL-MCNC: 0.7 MG/DL
BUN SERPL-MCNC: 16 MG/DL
CALCIUM SERPL-MCNC: 9.8 MG/DL
CHLORIDE SERPL-SCNC: 104 MMOL/L
CHOLEST SERPL-MCNC: 256 MG/DL
CO2 SERPL-SCNC: 27 MMOL/L
CREAT SERPL-MCNC: 0.87 MG/DL
GLUCOSE SERPL-MCNC: 106 MG/DL
HDLC SERPL-MCNC: 96 MG/DL
LDLC SERPL CALC-MCNC: 138 MG/DL
NONHDLC SERPL-MCNC: 160 MG/DL
POTASSIUM SERPL-SCNC: 3.7 MMOL/L
PROT SERPL-MCNC: 7.1 G/DL
SODIUM SERPL-SCNC: 144 MMOL/L
TRIGL SERPL-MCNC: 108 MG/DL

## 2021-11-17 ENCOUNTER — RX RENEWAL (OUTPATIENT)
Age: 61
End: 2021-11-17

## 2022-02-04 ENCOUNTER — APPOINTMENT (OUTPATIENT)
Dept: FAMILY MEDICINE | Facility: CLINIC | Age: 62
End: 2022-02-04
Payer: COMMERCIAL

## 2022-02-04 VITALS
BODY MASS INDEX: 26.93 KG/M2 | WEIGHT: 152 LBS | SYSTOLIC BLOOD PRESSURE: 120 MMHG | HEART RATE: 86 BPM | HEIGHT: 63 IN | OXYGEN SATURATION: 95 % | DIASTOLIC BLOOD PRESSURE: 72 MMHG | TEMPERATURE: 98 F

## 2022-02-04 DIAGNOSIS — J45.909 UNSPECIFIED ASTHMA, UNCOMPLICATED: ICD-10-CM

## 2022-02-04 LAB
25(OH)D3 SERPL-MCNC: 30.6 NG/ML
ALBUMIN SERPL ELPH-MCNC: 4.5 G/DL
ALP BLD-CCNC: 84 U/L
ALT SERPL-CCNC: 16 U/L
ANION GAP SERPL CALC-SCNC: 12 MMOL/L
AST SERPL-CCNC: 16 U/L
BILIRUB SERPL-MCNC: 0.7 MG/DL
BUN SERPL-MCNC: 17 MG/DL
CALCIUM SERPL-MCNC: 10.2 MG/DL
CHLORIDE SERPL-SCNC: 101 MMOL/L
CHOLEST SERPL-MCNC: 281 MG/DL
CO2 SERPL-SCNC: 28 MMOL/L
CREAT SERPL-MCNC: 0.8 MG/DL
GLUCOSE SERPL-MCNC: 101 MG/DL
HDLC SERPL-MCNC: 97 MG/DL
LDLC SERPL CALC-MCNC: 162 MG/DL
NONHDLC SERPL-MCNC: 184 MG/DL
POTASSIUM SERPL-SCNC: 3.6 MMOL/L
PROT SERPL-MCNC: 7.4 G/DL
SODIUM SERPL-SCNC: 141 MMOL/L
TRIGL SERPL-MCNC: 112 MG/DL
TSH SERPL-ACNC: 1.39 UIU/ML

## 2022-02-04 PROCEDURE — 36415 COLL VENOUS BLD VENIPUNCTURE: CPT

## 2022-02-04 PROCEDURE — 99214 OFFICE O/P EST MOD 30 MIN: CPT | Mod: 25

## 2022-02-04 NOTE — HISTORY OF PRESENT ILLNESS
[de-identified] : 61 year old female is here for a followup visit. Patient is here for medication renewals and for blood work discussion. Medications and allergies were reviewed and assessed.  There has been no new medications since the last visit. Patient is feeling well with no active changes or issues since Her last visit.\par

## 2022-02-04 NOTE — HEALTH RISK ASSESSMENT
[Yes] : Yes [No falls in past year] : Patient reported no falls in the past year [0] : 2) Feeling down, depressed, or hopeless: Not at all (0) [[id number]] : 0 [Excellent] : ~his/her~  mood as  excellent [Patient reported mammogram was normal] : Patient reported mammogram was normal [Patient reported colonoscopy was normal] : Patient reported colonoscopy was normal [With Significant Other] : lives with significant other [Employed] : employed [] :  [# Of Children ___] : has [unfilled] children [Fully functional (bathing, dressing, toileting, transferring, walking, feeding)] : Fully functional (bathing, dressing, toileting, transferring, walking, feeding) [Fully functional (using the telephone, shopping, preparing meals, housekeeping, doing laundry, using] : Fully functional and needs no help or supervision to perform IADLs (using the telephone, shopping, preparing meals, housekeeping, doing laundry, using transportation, managing medications and managing finances) [MammogramDate] : 01/2021 [ColonoscopyDate] : 11/2020 [de-identified] : [facility]

## 2022-02-04 NOTE — ASSESSMENT
[FreeTextEntry1] : ordered mammo\par \par asthma\par only in the spring, will refill\par \par hypertension\par The patient has a diagnosis of hypertension. Blood work was drawn in office and will be followed.  The diagnosis was discussed with patient and need for medication compliance and possible side affects and risks of noncompliance. Patient was told to adhere to a low salt diet and try to incorporate exercise daily.\par \par thyroid\par Patient had a diagnosis of thyroid disorder. Blood was drawn to assess levels of TSH and T4. Patient will continue on current dose of medications at this time unless instructed otherwise. Patient was advised to take thyroid medications on a empty stomach and to wait at least 45 minutes before eating for appropriate absorption.\par \par cholesterol\par The diagnosis of high cholesterol is established and patient is currently taking medications. Blood work was drawn in office and results will be reviewed and followed. The patient was counseled on a low cholesterol diet and on medication compliance. Patient was advised to generally limit foods fried in oil, high in saturated fat, cheese, eggs and red meat. Patient was advised to continue on current medications and to followup in office for necessary blood work in three months.\par patient failed atorvastatin - feels bad, loss of hair, body aches, doing better on crestor\par \par

## 2022-02-06 LAB
ESTIMATED AVERAGE GLUCOSE: 97 MG/DL
HBA1C MFR BLD HPLC: 5 %

## 2022-03-07 ENCOUNTER — RESULT REVIEW (OUTPATIENT)
Age: 62
End: 2022-03-07

## 2022-09-07 ENCOUNTER — RX RENEWAL (OUTPATIENT)
Age: 62
End: 2022-09-07

## 2022-09-11 ENCOUNTER — NON-APPOINTMENT (OUTPATIENT)
Age: 62
End: 2022-09-11

## 2022-09-11 ENCOUNTER — APPOINTMENT (OUTPATIENT)
Dept: FAMILY MEDICINE | Facility: CLINIC | Age: 62
End: 2022-09-11

## 2022-09-11 VITALS
HEART RATE: 74 BPM | WEIGHT: 158.25 LBS | DIASTOLIC BLOOD PRESSURE: 64 MMHG | BODY MASS INDEX: 28.04 KG/M2 | OXYGEN SATURATION: 94 % | SYSTOLIC BLOOD PRESSURE: 122 MMHG | HEIGHT: 63 IN

## 2022-09-11 PROCEDURE — 36415 COLL VENOUS BLD VENIPUNCTURE: CPT

## 2022-09-11 PROCEDURE — 99214 OFFICE O/P EST MOD 30 MIN: CPT | Mod: 25

## 2022-09-11 PROCEDURE — 93000 ELECTROCARDIOGRAM COMPLETE: CPT

## 2022-09-11 NOTE — PHYSICAL EXAM
[No Acute Distress] : no acute distress [Well Nourished] : well nourished [Well Developed] : well developed [Well-Appearing] : well-appearing [Normal Sclera/Conjunctiva] : normal sclera/conjunctiva [PERRL] : pupils equal round and reactive to light [EOMI] : extraocular movements intact [Normal Outer Ear/Nose] : the outer ears and nose were normal in appearance [Normal Oropharynx] : the oropharynx was normal [No JVD] : no jugular venous distention [No Lymphadenopathy] : no lymphadenopathy [Supple] : supple [Thyroid Normal, No Nodules] : the thyroid was normal and there were no nodules present [No Respiratory Distress] : no respiratory distress  [No Accessory Muscle Use] : no accessory muscle use [Clear to Auscultation] : lungs were clear to auscultation bilaterally [Normal Rate] : normal rate  [Regular Rhythm] : with a regular rhythm [Normal S1, S2] : normal S1 and S2 [No Murmur] : no murmur heard [No Carotid Bruits] : no carotid bruits [No Abdominal Bruit] : a ~M bruit was not heard ~T in the abdomen [No Varicosities] : no varicosities [Pedal Pulses Present] : the pedal pulses are present [No Edema] : there was no peripheral edema [No Palpable Aorta] : no palpable aorta [No Extremity Clubbing/Cyanosis] : no extremity clubbing/cyanosis [Soft] : abdomen soft [Non Tender] : non-tender [Non-distended] : non-distended [No Masses] : no abdominal mass palpated [No HSM] : no HSM [Normal Bowel Sounds] : normal bowel sounds [Normal Posterior Cervical Nodes] : no posterior cervical lymphadenopathy [Normal Anterior Cervical Nodes] : no anterior cervical lymphadenopathy [No CVA Tenderness] : no CVA  tenderness [No Spinal Tenderness] : no spinal tenderness [No Joint Swelling] : no joint swelling [Grossly Normal Strength/Tone] : grossly normal strength/tone [No Rash] : no rash [Coordination Grossly Intact] : coordination grossly intact [No Focal Deficits] : no focal deficits [Normal Gait] : normal gait [Deep Tendon Reflexes (DTR)] : deep tendon reflexes were 2+ and symmetric [Normal Affect] : the affect was normal [Normal Insight/Judgement] : insight and judgment were intact [de-identified] : right sternal border pain on palpation

## 2022-09-11 NOTE — REVIEW OF SYSTEMS
[Fatigue] : fatigue [Chest Pain] : chest pain [Fever] : no fever [Chills] : no chills [Hot Flashes] : no hot flashes [Night Sweats] : no night sweats [Recent Change In Weight] : ~T no recent weight change [Palpitations] : no palpitations [Leg Claudication] : no leg claudication [Lower Ext Edema] : no lower extremity edema [Orthopnea] : no orthopnea [Paroxysmal Nocturnal Dyspnea] : no paroxysmal nocturnal dyspnea [Shortness Of Breath] : no shortness of breath [Wheezing] : no wheezing [Cough] : no cough [Dyspnea on Exertion] : dyspnea on exertion [Anxiety] : anxiety [Negative] : Gastrointestinal [FreeTextEntry9] : chest wall pain

## 2022-09-11 NOTE — HISTORY OF PRESENT ILLNESS
[FreeTextEntry1] : med renewal  [de-identified] : patient needs blood work med renewal. Using inhaler more often this month, and needs renewal. States she gets winded walking up stairs, and is very fatigued. HR only 44. C/o chest wall pain in one spot for 2 minutes. denies sob, palpitations, syncope, diaphoresis at the time

## 2022-09-14 ENCOUNTER — APPOINTMENT (OUTPATIENT)
Dept: CARDIOLOGY | Facility: CLINIC | Age: 62
End: 2022-09-14

## 2022-09-14 VITALS
WEIGHT: 156 LBS | OXYGEN SATURATION: 98 % | BODY MASS INDEX: 27.64 KG/M2 | DIASTOLIC BLOOD PRESSURE: 86 MMHG | SYSTOLIC BLOOD PRESSURE: 142 MMHG | HEART RATE: 68 BPM | HEIGHT: 63 IN | TEMPERATURE: 98.7 F

## 2022-09-14 DIAGNOSIS — R00.1 BRADYCARDIA, UNSPECIFIED: ICD-10-CM

## 2022-09-14 LAB
25(OH)D3 SERPL-MCNC: 37.8 NG/ML
ALBUMIN SERPL ELPH-MCNC: 4.3 G/DL
ALP BLD-CCNC: 82 U/L
ALT SERPL-CCNC: 19 U/L
ANION GAP SERPL CALC-SCNC: 14 MMOL/L
APPEARANCE: CLEAR
AST SERPL-CCNC: 17 U/L
BASOPHILS # BLD AUTO: 0.06 K/UL
BASOPHILS NFR BLD AUTO: 0.8 %
BILIRUB SERPL-MCNC: 0.8 MG/DL
BILIRUBIN URINE: NEGATIVE
BLOOD URINE: NEGATIVE
BUN SERPL-MCNC: 12 MG/DL
CALCIUM SERPL-MCNC: 9.7 MG/DL
CHLORIDE SERPL-SCNC: 99 MMOL/L
CHOLEST SERPL-MCNC: 223 MG/DL
CO2 SERPL-SCNC: 27 MMOL/L
COLOR: NORMAL
CREAT SERPL-MCNC: 0.75 MG/DL
EGFR: 89 ML/MIN/1.73M2
EOSINOPHIL # BLD AUTO: 0.22 K/UL
EOSINOPHIL NFR BLD AUTO: 2.9 %
ESTIMATED AVERAGE GLUCOSE: 100 MG/DL
FOLATE SERPL-MCNC: >20 NG/ML
GLUCOSE QUALITATIVE U: NEGATIVE
GLUCOSE SERPL-MCNC: 67 MG/DL
HBA1C MFR BLD HPLC: 5.1 %
HCT VFR BLD CALC: 39.4 %
HDLC SERPL-MCNC: 95 MG/DL
HGB BLD-MCNC: 13 G/DL
IMM GRANULOCYTES NFR BLD AUTO: 0.1 %
KETONES URINE: NEGATIVE
LDLC SERPL CALC-MCNC: 109 MG/DL
LEUKOCYTE ESTERASE URINE: NEGATIVE
LYMPHOCYTES # BLD AUTO: 3.07 K/UL
LYMPHOCYTES NFR BLD AUTO: 40.1 %
MAGNESIUM SERPL-MCNC: 2.2 MG/DL
MAN DIFF?: NORMAL
MCHC RBC-ENTMCNC: 30.2 PG
MCHC RBC-ENTMCNC: 33 GM/DL
MCV RBC AUTO: 91.6 FL
MONOCYTES # BLD AUTO: 0.5 K/UL
MONOCYTES NFR BLD AUTO: 6.5 %
NEUTROPHILS # BLD AUTO: 3.8 K/UL
NEUTROPHILS NFR BLD AUTO: 49.6 %
NITRITE URINE: NEGATIVE
NONHDLC SERPL-MCNC: 128 MG/DL
PH URINE: 6
PLATELET # BLD AUTO: 279 K/UL
POTASSIUM SERPL-SCNC: 3.4 MMOL/L
PROT SERPL-MCNC: 7.1 G/DL
PROTEIN URINE: NEGATIVE
RBC # BLD: 4.3 M/UL
RBC # FLD: 12.6 %
SODIUM SERPL-SCNC: 140 MMOL/L
SPECIFIC GRAVITY URINE: 1.02
TRIGL SERPL-MCNC: 99 MG/DL
TSH SERPL-ACNC: 0.87 UIU/ML
UROBILINOGEN URINE: NORMAL
VIT B12 SERPL-MCNC: 718 PG/ML
WBC # FLD AUTO: 7.66 K/UL

## 2022-09-14 PROCEDURE — 99204 OFFICE O/P NEW MOD 45 MIN: CPT

## 2022-09-14 NOTE — DISCUSSION/SUMMARY
[FreeTextEntry1] : 62F\par \par Sinus bradycardia: Asymptomatic but likely medication induced. Agree with holding atenolol. If more BP control is needed can increase dose of Valsartan-HCTZ\par \par HTN: BP borderline today. Can increase valsartan/HCTZ as needed. Continue same dose for now\par \par Vague, atypical CP, PINEDA- longstanding symptoms. Some risk factors for CAD. Will evaluate further with TTE, EST to rule out ischemia. If pain gets worse, to go to ER\par \par RV after testing

## 2022-09-14 NOTE — HISTORY OF PRESENT ILLNESS
[FreeTextEntry1] : 62F with HTN, HLD who presents for evaluation of sinus bradycardia\par \par Pt with longstanding hx of sinus bradycardia\par Noted on exam with PCP earlier this week with sinus trace to 48\par Atenolol was stopped, since then has noted more lightheadedness, increasing LE edema\par More fatigue\par She also notes occasional CP, tightness in center of chest, nonexertional, nonradiating, lasts 5-10 minutes at a time. \par Ongoing dyspnea\par \par Never smoker. Denies family hx of CAD. Works as a  for [employer].\par \par ECG: Sinus bradycardia to 43\par \par Rosuvastatin 20mg\par \par Valsartan 80mg-HCTZ 12.5mg\par

## 2022-09-30 ENCOUNTER — APPOINTMENT (OUTPATIENT)
Dept: FAMILY MEDICINE | Facility: CLINIC | Age: 62
End: 2022-09-30

## 2022-09-30 VITALS
WEIGHT: 156 LBS | DIASTOLIC BLOOD PRESSURE: 82 MMHG | HEIGHT: 63 IN | OXYGEN SATURATION: 97 % | HEART RATE: 83 BPM | TEMPERATURE: 98.2 F | BODY MASS INDEX: 27.64 KG/M2 | SYSTOLIC BLOOD PRESSURE: 138 MMHG

## 2022-09-30 DIAGNOSIS — K21.9 GASTRO-ESOPHAGEAL REFLUX DISEASE W/OUT ESOPHAGITIS: ICD-10-CM

## 2022-09-30 PROCEDURE — 99214 OFFICE O/P EST MOD 30 MIN: CPT

## 2022-10-02 PROBLEM — K21.9 GERD (GASTROESOPHAGEAL REFLUX DISEASE): Status: ACTIVE | Noted: 2022-10-02

## 2022-10-02 NOTE — HISTORY OF PRESENT ILLNESS
[FreeTextEntry1] : med renewal  [de-identified] : 9/30/2022: sob persists, worse as day goes on. talking triggers sob. eating triggers sob, decreased exercise tolerance. bitter taste in mouth. \par \par 9/11/2022: patient needs blood work med renewal. Using inhaler more often this month, and needs renewal. States she gets winded walking up stairs, and is very fatigued. HR only 44. C/o chest wall pain in one spot for 2 minutes. denies sob, palpitations, syncope, diaphoresis at the time

## 2022-10-02 NOTE — REVIEW OF SYSTEMS
[Fatigue] : fatigue [Chest Pain] : chest pain [Dyspnea on Exertion] : dyspnea on exertion [Anxiety] : anxiety [Negative] : Gastrointestinal [Fever] : no fever [Chills] : no chills [Hot Flashes] : no hot flashes [Night Sweats] : no night sweats [Recent Change In Weight] : ~T no recent weight change [Palpitations] : no palpitations [Leg Claudication] : no leg claudication [Lower Ext Edema] : no lower extremity edema [Orthopnea] : no orthopnea [Paroxysmal Nocturnal Dyspnea] : no paroxysmal nocturnal dyspnea [Shortness Of Breath] : no shortness of breath [Wheezing] : no wheezing [Cough] : no cough [FreeTextEntry9] : chest wall pain

## 2022-10-02 NOTE — PHYSICAL EXAM
[No Acute Distress] : no acute distress [Well Nourished] : well nourished [Well Developed] : well developed [Well-Appearing] : well-appearing [Normal Sclera/Conjunctiva] : normal sclera/conjunctiva [PERRL] : pupils equal round and reactive to light [EOMI] : extraocular movements intact [Normal Outer Ear/Nose] : the outer ears and nose were normal in appearance [Normal Oropharynx] : the oropharynx was normal [No JVD] : no jugular venous distention [No Lymphadenopathy] : no lymphadenopathy [Supple] : supple [Thyroid Normal, No Nodules] : the thyroid was normal and there were no nodules present [No Respiratory Distress] : no respiratory distress  [No Accessory Muscle Use] : no accessory muscle use [Clear to Auscultation] : lungs were clear to auscultation bilaterally [Normal Rate] : normal rate  [Regular Rhythm] : with a regular rhythm [Normal S1, S2] : normal S1 and S2 [No Murmur] : no murmur heard [No Carotid Bruits] : no carotid bruits [No Abdominal Bruit] : a ~M bruit was not heard ~T in the abdomen [No Varicosities] : no varicosities [Pedal Pulses Present] : the pedal pulses are present [No Edema] : there was no peripheral edema [No Palpable Aorta] : no palpable aorta [No Extremity Clubbing/Cyanosis] : no extremity clubbing/cyanosis [Soft] : abdomen soft [Non Tender] : non-tender [Non-distended] : non-distended [No Masses] : no abdominal mass palpated [No HSM] : no HSM [Normal Bowel Sounds] : normal bowel sounds [Normal Posterior Cervical Nodes] : no posterior cervical lymphadenopathy [Normal Anterior Cervical Nodes] : no anterior cervical lymphadenopathy [No CVA Tenderness] : no CVA  tenderness [No Spinal Tenderness] : no spinal tenderness [No Joint Swelling] : no joint swelling [Grossly Normal Strength/Tone] : grossly normal strength/tone [No Rash] : no rash [Coordination Grossly Intact] : coordination grossly intact [No Focal Deficits] : no focal deficits [Normal Gait] : normal gait [Deep Tendon Reflexes (DTR)] : deep tendon reflexes were 2+ and symmetric [Normal Affect] : the affect was normal [Normal Insight/Judgement] : insight and judgment were intact [de-identified] : right sternal border pain on palpation

## 2022-11-28 ENCOUNTER — APPOINTMENT (OUTPATIENT)
Dept: INTERNAL MEDICINE | Facility: CLINIC | Age: 62
End: 2022-11-28

## 2022-11-28 ENCOUNTER — APPOINTMENT (OUTPATIENT)
Dept: CARDIOLOGY | Facility: CLINIC | Age: 62
End: 2022-11-28

## 2022-11-28 PROCEDURE — 99213 OFFICE O/P EST LOW 20 MIN: CPT | Mod: 25

## 2022-11-28 PROCEDURE — 93015 CV STRESS TEST SUPVJ I&R: CPT

## 2022-11-28 PROCEDURE — 93306 TTE W/DOPPLER COMPLETE: CPT

## 2023-03-03 ENCOUNTER — APPOINTMENT (OUTPATIENT)
Dept: CARDIOLOGY | Facility: CLINIC | Age: 63
End: 2023-03-03
Payer: COMMERCIAL

## 2023-03-03 VITALS
SYSTOLIC BLOOD PRESSURE: 160 MMHG | TEMPERATURE: 98 F | HEART RATE: 82 BPM | OXYGEN SATURATION: 99 % | DIASTOLIC BLOOD PRESSURE: 79 MMHG | WEIGHT: 158 LBS | BODY MASS INDEX: 28 KG/M2 | HEIGHT: 63 IN

## 2023-03-03 VITALS — SYSTOLIC BLOOD PRESSURE: 148 MMHG | DIASTOLIC BLOOD PRESSURE: 92 MMHG

## 2023-03-03 PROCEDURE — 99214 OFFICE O/P EST MOD 30 MIN: CPT

## 2023-03-03 NOTE — DISCUSSION/SUMMARY
[FreeTextEntry1] : 62F\par \par BP running high and patient is symptomatic when her BP gets high. \par Would avoid restarting atenolol as it caused bradycardia\par Will double Valsartan to 160 mg-12.5 mg\par \par HLD- Lipids improving on statin, watch closely. Needs repeat\par \par No further cardiac symptoms, prior work up WNL \par \par RV 6M \par

## 2023-03-03 NOTE — HISTORY OF PRESENT ILLNESS
[FreeTextEntry1] : 62F with HTN, HLD who presents for follow up\par \par Had been on atenolol, d/c'ed due to bradycardia to 40s\par Since then, BP has been running intermittently high\par She can usually tell when her BP is high\par Some dyspnea which she attributes to allergies \par \par Never smoker. Denies family hx of CAD. Works as a  for [employer].\par \par ECG: Sinus bradycardia to 43\par TTE 11/22: Normal\par EST: 9:00 mins, no ischemic ECG changes\par \par Rosuvastatin 20mg\par \par Valsartan 80mg-HCTZ 12.5mg (increaes to Valsartan 160mg- HCTZ 12.5)\par

## 2023-04-07 ENCOUNTER — APPOINTMENT (OUTPATIENT)
Dept: FAMILY MEDICINE | Facility: CLINIC | Age: 63
End: 2023-04-07
Payer: COMMERCIAL

## 2023-04-07 VITALS
SYSTOLIC BLOOD PRESSURE: 122 MMHG | OXYGEN SATURATION: 98 % | HEIGHT: 63 IN | TEMPERATURE: 98 F | DIASTOLIC BLOOD PRESSURE: 74 MMHG | WEIGHT: 158 LBS | BODY MASS INDEX: 28 KG/M2 | HEART RATE: 84 BPM

## 2023-04-07 DIAGNOSIS — E87.6 HYPOKALEMIA: ICD-10-CM

## 2023-04-07 DIAGNOSIS — R43.8 OTHER DISTURBANCES OF SMELL AND TASTE: ICD-10-CM

## 2023-04-07 DIAGNOSIS — M54.30 SCIATICA, UNSPECIFIED SIDE: ICD-10-CM

## 2023-04-07 DIAGNOSIS — M54.9 DORSALGIA, UNSPECIFIED: ICD-10-CM

## 2023-04-07 PROCEDURE — 36415 COLL VENOUS BLD VENIPUNCTURE: CPT

## 2023-04-07 PROCEDURE — 99214 OFFICE O/P EST MOD 30 MIN: CPT | Mod: 25

## 2023-04-07 RX ORDER — PANTOPRAZOLE 40 MG/1
40 TABLET, DELAYED RELEASE ORAL DAILY
Qty: 90 | Refills: 5 | Status: DISCONTINUED | COMMUNITY
Start: 2022-09-30 | End: 2023-04-07

## 2023-04-07 NOTE — PHYSICAL EXAM
[No Acute Distress] : no acute distress [Well Developed] : well developed [No Edema] : there was no peripheral edema [No Extremity Clubbing/Cyanosis] : no extremity clubbing/cyanosis [Normal] : affect was normal and insight and judgment were intact [Normal Oropharynx] : the oropharynx was normal [No Lymphadenopathy] : no lymphadenopathy [Grossly Normal Strength/Tone] : grossly normal strength/tone

## 2023-04-07 NOTE — HISTORY OF PRESENT ILLNESS
Chief Complaint  Cough (x1 week muscle soreness from coughing )    Subjective          Amanda Sherwood presents to [facility]  History of Present Illness       Patient presents the office for 1 month follow-up and to discuss new onset cough.  Patient was last seen by myself on 10/26/2021 to follow-up on fatigue, shortness of breath, pulmonary emboli, chronic cough, heartburn.    Cough-patient does have a chronic cough which she is being followed by pulmonary on.  Last visit with pulmonology was on 11/9/2021.  Patient said overall shortness of breath is much improved but still has chronic cough and being managed through Symbicort and albuterol inhaler.  Patient said though 5 days ago she feels like her cough is changed to her bronchitis cough.  Patient said that she gets bronchitis yearly and usually has to get azithromycin along with Medrol Dosepak.  Patient's cough is nonproductive and has been keeping her up at night.  No shortness of breath or chest pain.  Patient does that she feels bruise due to coughing so much.  No loss of taste or smell.  No fever, chills, muscle aches.  Patient also does have some wheezing associated.    Fatigue-patient has been going to physical therapy to help with fatigue and weakness.  Patient said her fatigue weakness is much improved but she did not go last week due to feeling unwell.  Patient does plan to continue to follow-up with them.    Pulmonary emboli-still on Eliquis 5 mg tolerating it well.  Patient is still following up with hematology.  Next visit with hematology is on 2/7/2022.    Heartburn-currently being followed by gastroenterology.  Patient is being scheduled for gastric emptying study and they added on Compazine for her nausea and increased her PPI.  Last visit with them was on 11/18/2021.        Objective   Vital Signs:   /86   Pulse 76   Temp 96.6 °F (35.9 °C)   Resp 16   Wt 88.9 kg (196 lb)   SpO2 95%   BMI 32.62 kg/m²    [FreeTextEntry1] : Here for follow-up; needs med refills.\par    Physical Exam  Vitals reviewed.   Constitutional:       General: She is not in acute distress.  HENT:      Head: Normocephalic.      Right Ear: Tympanic membrane and ear canal normal.      Left Ear: Tympanic membrane and ear canal normal.      Nose: Nose normal.   Cardiovascular:      Rate and Rhythm: Regular rhythm.      Heart sounds: Normal heart sounds.   Pulmonary:      Effort: Pulmonary effort is normal.      Breath sounds: No stridor. Wheezing present. No rhonchi or rales.   Musculoskeletal:         General: Normal range of motion.   Neurological:      General: No focal deficit present.      Mental Status: She is alert.   Psychiatric:         Mood and Affect: Mood normal.        Result Review :               Chest x-ray ordered.  Appears similar to previous exam in August.  Awaiting full radiology report.    Assessment and Plan    Diagnoses and all orders for this visit:    1. Bronchitis (Primary)  -     methylPREDNISolone (MEDROL) 4 MG dose pack; Take as directed on package instructions.  Dispense: 1 each; Refill: 0    2. Cough  -     COVID-19,[facility] ROUTINE, NP/OP SWAB IN TRANSPORT MEDIA OR ESWAB 72 HR TAT - Swab, Oropharynx; Future  -     COVID-19,[facility] ROUTINE, NP/OP SWAB IN TRANSPORT MEDIA OR ESWAB 72 HR TAT - Swab, Oropharynx  -     XR Chest PA & Lateral (In Office)    3. Elevated serum creatinine  -     Basic metabolic panel    4. Multiple subsegmental pulmonary emboli without acute cor pulmonale (HCC)  Assessment & Plan:  Currently followed by hematology and controlled on Eliquis 5 mg.  Continue current regimen.      5. Pulmonary nodule  Assessment & Plan:  Patient need repeat CT.  Pulmonology note on 11/9/2021 said they would order the repeat CT.  Patient is waiting to hear back from them.        6. Other fatigue  Assessment & Plan:  Getting progressively better with physical therapy.  Continue current regimen of physical therapy for now.      7. Heartburn  Assessment & Plan:  Currently being  [de-identified] : Here for follow-up; needs med refills.\par \par Checking at home-127/80s.  BP meds adjusted at cardio. \par \par Has had some episodes of muscle cramps.  \par Has had Bitter taste in mouth-changed toothpaste. No change. Few months.  No heartburn symptoms. \par \par Has been experiencing-bad gas and stomach cramping.  Last colonoscopy few years ago.  Requesting cancer markers to be checked. \par Has a history of syncope. \par \par  followed by gastroenterology.  Continue omeprazole as prescribed and Compazine for nausea.      Pleasant 57-year-old female presents the clinic today with complaints of cough that has been worse over the past 5 days.  Patient said that this usually happens whenever she gets bronchitis and usually uses a Z-David and Medrol Dosepak to help with that.  Cough is just progressively gotten worse and she has been able to sleep for the past 2 days.  Obtaining Covid test today.  Counseled to quarantine at this time.  Sending a Medrol Dosepak.  Chest x-ray is obtained and looks similar to previous in August.  Awaiting full radiology report to see if there is any underlying pneumonia.  We will follow up with this results may need to add on antibiotic when it does.    See plan for other current medical issues above.  Following up with patient in 3 months to recheck fatigue.  Patient able to come back sooner to the clinic if needed as well.      Follow Up   Return in about 3 months (around 2/28/2022) for Recheck fatigue / PE.  Patient was given instructions and counseling regarding her condition or for health maintenance advice. Please see specific information pulled into the AVS if appropriate.     Medical assistant and I wore mask and eyewear protection during entire encounter.  Patient wore mask.      Electronically signed by OLVIN Mcgee, 11/29/21, 12:42 PM EST.

## 2023-04-07 NOTE — REVIEW OF SYSTEMS
[Fever] : no fever [Chills] : no chills [Nasal Discharge] : no nasal discharge [Postnasal Drip] : no postnasal drip [Chest Pain] : no chest pain [Palpitations] : no palpitations [Wheezing] : no wheezing [Cough] : no cough [Constipation] : no constipation [Diarrhea] : diarrhea [Vomiting] : no vomiting [Heartburn] : no heartburn [Dysuria] : no dysuria [Headache] : no headache [Dizziness] : no dizziness [FreeTextEntry7] : lactose intolerant; gas no reflux; sometimes getting soft stool after taking crestor. Tried taking a break from Crestor.  [de-identified] : Very busy with work.  Muscle Cramps at night.

## 2023-04-07 NOTE — PLAN
[FreeTextEntry1] : Will follow up labwork drawn in office today.\par \par BP in range today. \par \par Muscle cramps-check labs; discussed gentle stretching before bed.  Adequate hydration. \par Needs Gastro follow-up.  Will check markers-if abnormal will need specialist evaluation. \par \par Advising ENT evaluation for ongoing bitter taste.\par Will adjust meds based on labs. \par Patient expressed understanding of plan.\par

## 2023-04-12 LAB
AFP-TM SERPL-MCNC: 10 NG/ML
ALBUMIN SERPL ELPH-MCNC: 4.5 G/DL
ALP BLD-CCNC: 93 U/L
ALT SERPL-CCNC: 23 U/L
ANION GAP SERPL CALC-SCNC: 13 MMOL/L
AST SERPL-CCNC: 20 U/L
BASOPHILS # BLD AUTO: 0.06 K/UL
BASOPHILS NFR BLD AUTO: 0.9 %
BILIRUB SERPL-MCNC: 0.6 MG/DL
BUN SERPL-MCNC: 14 MG/DL
CALCIUM SERPL-MCNC: 10.2 MG/DL
CANCER AG125 SERPL-ACNC: 7 U/ML
CANCER AG19-9 SERPL-ACNC: 6 U/ML
CHLORIDE SERPL-SCNC: 99 MMOL/L
CHOLEST SERPL-MCNC: 214 MG/DL
CO2 SERPL-SCNC: 30 MMOL/L
CREAT SERPL-MCNC: 0.79 MG/DL
EGFR: 83 ML/MIN/1.73M2
EOSINOPHIL # BLD AUTO: 0.24 K/UL
EOSINOPHIL NFR BLD AUTO: 3.5 %
ESTIMATED AVERAGE GLUCOSE: 105 MG/DL
FOLATE SERPL-MCNC: >20 NG/ML
GLUCOSE SERPL-MCNC: 87 MG/DL
HBA1C MFR BLD HPLC: 5.3 %
HCT VFR BLD CALC: 42.9 %
HDLC SERPL-MCNC: 104 MG/DL
HGB BLD-MCNC: 13.9 G/DL
IMM GRANULOCYTES NFR BLD AUTO: 0.3 %
LDLC SERPL CALC-MCNC: 90 MG/DL
LYMPHOCYTES # BLD AUTO: 2.66 K/UL
LYMPHOCYTES NFR BLD AUTO: 38.6 %
MAN DIFF?: NORMAL
MCHC RBC-ENTMCNC: 29.6 PG
MCHC RBC-ENTMCNC: 32.4 GM/DL
MCV RBC AUTO: 91.3 FL
MONOCYTES # BLD AUTO: 0.48 K/UL
MONOCYTES NFR BLD AUTO: 7 %
NEUTROPHILS # BLD AUTO: 3.44 K/UL
NEUTROPHILS NFR BLD AUTO: 49.7 %
NONHDLC SERPL-MCNC: 110 MG/DL
PLATELET # BLD AUTO: 290 K/UL
POTASSIUM SERPL-SCNC: 3.9 MMOL/L
PROT SERPL-MCNC: 7.4 G/DL
RBC # BLD: 4.7 M/UL
RBC # FLD: 12.2 %
SODIUM SERPL-SCNC: 141 MMOL/L
TRIGL SERPL-MCNC: 99 MG/DL
TSH SERPL-ACNC: 0.89 UIU/ML
VIT B12 SERPL-MCNC: 767 PG/ML
WBC # FLD AUTO: 6.9 K/UL

## 2023-05-26 ENCOUNTER — APPOINTMENT (OUTPATIENT)
Dept: ORTHOPEDIC SURGERY | Facility: CLINIC | Age: 63
End: 2023-05-26
Payer: COMMERCIAL

## 2023-05-26 VITALS — HEIGHT: 63 IN | WEIGHT: 158 LBS | BODY MASS INDEX: 28 KG/M2

## 2023-05-26 DIAGNOSIS — M25.562 PAIN IN LEFT KNEE: ICD-10-CM

## 2023-05-26 DIAGNOSIS — S83.402D SPRAIN OF UNSPECIFIED COLLATERAL LIGAMENT OF LEFT KNEE, SUBSEQUENT ENCOUNTER: ICD-10-CM

## 2023-05-26 DIAGNOSIS — M48.061 SPINAL STENOSIS, LUMBAR REGION WITHOUT NEUROGENIC CLAUDICATION: ICD-10-CM

## 2023-05-26 DIAGNOSIS — M25.572 PAIN IN LEFT ANKLE AND JOINTS OF LEFT FOOT: ICD-10-CM

## 2023-05-26 PROCEDURE — 72170 X-RAY EXAM OF PELVIS: CPT

## 2023-05-26 PROCEDURE — 73610 X-RAY EXAM OF ANKLE: CPT | Mod: LT

## 2023-05-26 PROCEDURE — 73562 X-RAY EXAM OF KNEE 3: CPT | Mod: LT

## 2023-05-26 PROCEDURE — 99214 OFFICE O/P EST MOD 30 MIN: CPT

## 2023-05-26 PROCEDURE — 73630 X-RAY EXAM OF FOOT: CPT | Mod: LT

## 2023-05-26 PROCEDURE — 72100 X-RAY EXAM L-S SPINE 2/3 VWS: CPT

## 2023-05-26 NOTE — HISTORY OF PRESENT ILLNESS
[7] : 7 [9] : 9 [Radiating] : radiating [Stabbing] : stabbing [Tingling] : tingling [Constant] : constant [Nothing helps with pain getting better] : Nothing helps with pain getting better [Lying in bed] : lying in bed [de-identified] : 5/26/23: left leg back and ankle pain getting worse.  history of lumbar stenosis. [] : no [FreeTextEntry1] : left side.  [FreeTextEntry6] : cramping, numbness in the foot  [FreeTextEntry7] : up and down the leg .

## 2023-05-26 NOTE — PHYSICAL EXAM
[Facet arthropathy] : Facet arthropathy [NL (0)] : extension 0 degrees [4___] : quadriceps 4[unfilled]/5 [5___] : hamstring 5[unfilled]/5 [TWNoteComboBox7] : flexion 130 degrees [] : lateral ligament tenderness [Left] : left ankle [There are no fractures, subluxations or dislocations. No significant abnormalities are seen] : There are no fractures, subluxations or dislocations. No significant abnormalities are seen

## 2023-05-26 NOTE — ASSESSMENT
[FreeTextEntry1] : left leg pain. history of lumbar stenosis and shooting pain down the leg.  cramping in leg.  tingling in leg. possible worsening of nerve compression.\par \par left knee pain.  possible lmt.\par \par left foot and ankle pain.  possible chondral injury lateral ankle or tendonitis.

## 2023-06-05 ENCOUNTER — APPOINTMENT (OUTPATIENT)
Dept: ORTHOPEDIC SURGERY | Facility: CLINIC | Age: 63
End: 2023-06-05

## 2023-06-12 ENCOUNTER — APPOINTMENT (OUTPATIENT)
Dept: ORTHOPEDIC SURGERY | Facility: CLINIC | Age: 63
End: 2023-06-12
Payer: COMMERCIAL

## 2023-06-12 VITALS — HEIGHT: 63 IN | BODY MASS INDEX: 28 KG/M2 | WEIGHT: 158 LBS

## 2023-06-12 PROCEDURE — 99214 OFFICE O/P EST MOD 30 MIN: CPT

## 2023-06-12 NOTE — HISTORY OF PRESENT ILLNESS
[Lower back] : lower back [Sudden] : sudden [10] : 10 [Dull/Aching] : dull/aching [Radiating] : radiating [Shooting] : shooting [Nothing helps with pain getting better] : Nothing helps with pain getting better [de-identified] : 6/12/23: Lower back pain. Hx of sciatica in the past, has received ISIDRO last one lasted for years. Pain lower back left sided occasionally radiates to LT LE with numbness and tingling. Recently having sever leg cramps.  [] : no [FreeTextEntry7] : left leg [FreeTextEntry5] : New Pt presenting with low back pain that shoots down left leg. Started 2021. Saw Jennifer; had MRI L Spine completed @ [facility]. Tried PT; epidurals -didn't help.  [de-identified] :  MRI L Spine completed @ [facility].

## 2023-06-12 NOTE — PHYSICAL EXAM
[Normal Coordination] : normal coordination [Normal Sensation] : normal sensation [Orientated] : orientated [Able to Communicate] : able to communicate [Peripheral vascular exam is grossly normal] : peripheral vascular exam is grossly normal

## 2023-06-12 NOTE — IMAGING
[de-identified] : LSPINE\par Palpation: No tenderness to palpation or spasm in bilateral thoracic and lumbar paraspinal musculature, no SI joint tenderness to palpation\par ROM: full with mild stiffness. \par Motor: no focal deficit \par Strength: 5/5 bilateral hip flexors, knee extensors, ankle dorsiflexors, EHL, ankle plantarflexors\par Sensation I LT \par + SLR LT \par Toe and heal walking intact \par Gait non antalgic \par

## 2023-06-12 NOTE — ASSESSMENT
[FreeTextEntry1] : LS MRI shows: L4-5 central HNP with L5 nerve root compression; L5-S1 HNP with left sided mild stenosis. Discussed refer to pain management since previous ISIDRO have been helpful. Since Dang has exhausted non surgical care including PT and ISIDRO her case could be treated surgically. Dang is hesitant at this moment to proceed with this course of treatment since two immediate family members have had perineural scarring that complicated their outcome; \par  [FreeTextEntry1] : Images personally reviewed.

## 2023-06-12 NOTE — DATA REVIEWED
[MRI] : MRI [Lumbar Spine] : lumbar spine [Report was reviewed and noted in the chart] : The report was reviewed and noted in the chart [I independently reviewed and interpreted images and report] : I independently reviewed and interpreted images and report [I reviewed the films/CD] : I reviewed the films/CD [FreeTextEntry1] : L4-5 central HNP  L5 nerve root compression\par L5-S1 HNP with left sided mild stenosis \par

## 2023-06-16 ENCOUNTER — APPOINTMENT (OUTPATIENT)
Dept: ORTHOPEDIC SURGERY | Facility: CLINIC | Age: 63
End: 2023-06-16

## 2023-06-20 ENCOUNTER — APPOINTMENT (OUTPATIENT)
Dept: ORTHOPEDIC SURGERY | Facility: CLINIC | Age: 63
End: 2023-06-20
Payer: COMMERCIAL

## 2023-06-20 VITALS — BODY MASS INDEX: 28 KG/M2 | WEIGHT: 158 LBS | HEIGHT: 63 IN

## 2023-06-20 DIAGNOSIS — M79.672 PAIN IN LEFT FOOT: ICD-10-CM

## 2023-06-20 DIAGNOSIS — S83.282S OTHER TEAR OF LATERAL MENISCUS, CURRENT INJURY, LEFT KNEE, SEQUELA: ICD-10-CM

## 2023-06-20 DIAGNOSIS — M17.5 OTHER UNILATERAL SECONDARY OSTEOARTHRITIS OF KNEE: ICD-10-CM

## 2023-06-20 PROCEDURE — 99214 OFFICE O/P EST MOD 30 MIN: CPT

## 2023-06-20 NOTE — HISTORY OF PRESENT ILLNESS
[7] : 7 [9] : 9 [Radiating] : radiating [Stabbing] : stabbing [Tingling] : tingling [Constant] : constant [Nothing helps with pain getting better] : Nothing helps with pain getting better [Lying in bed] : lying in bed [de-identified] : 5/26/23: left leg back and ankle pain getting worse.  history of lumbar stenosis. [] : no [FreeTextEntry1] : left side.  [FreeTextEntry6] : cramping, numbness in the foot  [FreeTextEntry7] : up and down the leg .

## 2023-06-20 NOTE — PHYSICAL EXAM
[Facet arthropathy] : Facet arthropathy [NL (0)] : extension 0 degrees [4___] : quadriceps 4[unfilled]/5 [5___] : hamstring 5[unfilled]/5 [Left] : left ankle [There are no fractures, subluxations or dislocations. No significant abnormalities are seen] : There are no fractures, subluxations or dislocations. No significant abnormalities are seen [TWNoteComboBox7] : flexion 130 degrees [] : no abrasion

## 2023-06-20 NOTE — DATA REVIEWED
[Lumbar Spine] : lumbar spine [Knee] : knee [MRI] : MRI [Left] : left [Ankle] : ankle [Report was reviewed and noted in the chart] : The report was reviewed and noted in the chart [I reviewed the films/CD and agree] : I reviewed the films/CD and agree

## 2023-06-20 NOTE — ASSESSMENT
[FreeTextEntry1] : left leg pain. history of lumbar stenosis and shooting pain down the leg.  cramping in leg.  mri 2023 which show compression. seeing dr ortiz who recommended surgery versus lesi.\par \par left knee pain.  mri shows signal in lateral menisucs.  mild oa.\par \par left foot and ankle pain.  mri shows ligament sprain, tendonitis.

## 2023-07-14 ENCOUNTER — APPOINTMENT (OUTPATIENT)
Dept: PAIN MANAGEMENT | Facility: CLINIC | Age: 63
End: 2023-07-14
Payer: COMMERCIAL

## 2023-07-14 VITALS — WEIGHT: 158 LBS | HEIGHT: 63 IN | BODY MASS INDEX: 28 KG/M2

## 2023-07-14 PROCEDURE — 99214 OFFICE O/P EST MOD 30 MIN: CPT

## 2023-07-14 NOTE — HISTORY OF PRESENT ILLNESS
[Lower back] : lower back [7] : 7 [9] : 9 [Tingling] : tingling [Household chores] : household chores [Nothing helps with pain getting better] : Nothing helps with pain getting better [Lying in bed] : lying in bed [Intermittent] : intermittent [FreeTextEntry1] : Initial HPI 07/14/23: \par Pain started many years ago but worsened over the past few months. Pain is on the LEFT side of the lower back and radiates down the left knee and calf to the toes described as a damian horse pain with associated numbness and tingling. Saw Dr. Whitman who recommended ISIDRO. \par \par MRI Lumbar Spine 6/5/23 independently reviewed: L4-5 central HNP with L5 impingement; L5-S1 HNP with left sided mild stenosis \par Conservative Care: has been doing PT \par Pain Medications: Mobic PRN \par Past Injections: Had ESIs in the past. \par Spine surgery: none \par Blood thinners: none\par  [] : no [FreeTextEntry6] : Cramping , numbness  [FreeTextEntry7] : leg and foot  [de-identified] : l mri

## 2023-07-14 NOTE — DISCUSSION/SUMMARY
[de-identified] : After discussing various treatment options with the patient including but not limited to oral medications, physical therapy, exercise modalities as well as interventional spinal injections, we have decided with the following plan:\par \par - Continue Home exercises, stretching, activity modification, physical therapy, and conservative care.\par - MRI report and/or images was reviewed and discussed with the patient.\par - Recommend Left L4-5, L5-S1 Transforaminal Epidural Steroid Injection under fluoroscopic guidance with image.\par - The risks, benefits and alternatives of the proposed procedure were explained in detail with the patient. The risks outlined include but are not limited to infection, bleeding, post-dural puncture headache, nerve injury, a temporary increase in pain, failure to resolve symptoms, allergic reaction, symptom recurrence, and possible elevation of blood sugar in diabetics. All questions were answered to patient's apparent satisfaction and he/she verbalized an understanding.\par - Patient is presenting with acute/sub-acute radicular pain with impairment in ADLs and functionality.  The pain has not responded to conservative care including NSAID therapy and/or physical therapy.  There is no bleeding tendency, unstable medical condition, or systemic infection.\par - Follow up in 1-2 weeks post injection for re-evaluation.\par

## 2023-07-14 NOTE — PHYSICAL EXAM
[de-identified] : Constitutional; Appears well, no apparent distress\par Ability to communicate: Normal \par Respiratory: non-labored breathing\par Skin: No rash noted\par Head: Normocephalic, atraumatic\par Neck: no visible thyroid enlargement\par Eyes: Extraocular movements intact\par Neurologic: Alert and oriented x3\par Psychiatric: normal mood, affect and behavior\par  [] : no ecchymosis

## 2023-07-18 ENCOUNTER — APPOINTMENT (OUTPATIENT)
Dept: ORTHOPEDIC SURGERY | Facility: CLINIC | Age: 63
End: 2023-07-18

## 2023-08-02 ENCOUNTER — APPOINTMENT (OUTPATIENT)
Dept: PAIN MANAGEMENT | Facility: CLINIC | Age: 63
End: 2023-08-02
Payer: COMMERCIAL

## 2023-08-02 PROCEDURE — A4550 SURGICAL TRAYS: CPT

## 2023-08-02 PROCEDURE — 64483 NJX AA&/STRD TFRM EPI L/S 1: CPT | Mod: LT

## 2023-08-02 PROCEDURE — J3490M: CUSTOM

## 2023-08-02 PROCEDURE — 64484 NJX AA&/STRD TFRM EPI L/S EA: CPT | Mod: 59,LT

## 2023-08-02 NOTE — PROCEDURE
[FreeTextEntry3] : Date of Service: 08/02/2023   Account: [id number]  Patient: KAREN HI   YOB: 1960  Age: 63 year   Surgeon:                                                        Jono Craven D.O.  Assistant:                                                        None  Pre-Operative Diagnosis:                            Lumbosacral radiculitis (M54.17)  Post-Operative Diagnosis:                          Same  Procedure:                                                     Left L4-5, L5-S1, transforaminal epidural steroid injection under fluoroscopic guidance.  Anesthesia:                                                    Local with MAC  This procedure was carried out using fluoroscopic guidance.  The risks and benefits of the procedure were discussed extensively with the patient.  The consent of the patient was obtained and the following procedure was performed. The patient was placed in the prone position on the fluoroscopic table and the lumbar area was prepped and draped in a sterile fashion. A timeout was performed with all essential staff present and the site and side were verified.  The Left L4-5, L5-S1 neural foramen were identified on left oblique "valencia dog" anatomical view at the 6 o'clock position using fluoroscopic guidance, and the area was marked. The overlying skin and subcutaneous structures were anesthetized using sterile technique with 1% Lidocaine.  A 22-gauge spinal needle was directed toward the inferior (6 o'clock) position of the pedicle, which formed the roof of the identified foramen.  Once in the epidural space, after negative aspiration for heme and CSF, 1cc of Omnipaque contrast was injected under live fluoroscopy to confirm epidural location and assess filling defects and rule out intravascular needle placement.   The following contrast flow and epidurogram was observed: no intravascular or intrathecal flow pattern was noted.  No blood or CSF was aspirated. Contrast spread appeared to outline the left L4 and L5 nerve roots and spread medially into the epidural space.    After this, 3 ml of a total mixture of 12 mg betamethasone, 2 ml of preservative free normal saline, and 2 ml of 0.25% bupivacaine was administered without any resistance in the epidural space at each of the two levels.   The needle was subsequently removed.  Vital signs remained normal.  Pulse oximeter was used throughout the procedure and the patient's pulse and oxygen saturation remained within normal limits.  The patient tolerated the procedure well.  There were no complications.  The patient was instructed to apply ice over the injection sites for twenty minutes every two hours for the next 24 to 48 hours.  The patient was also instructed to contact me immediately if there were any problems.  Jono Craven D.O.
NS

## 2023-08-18 ENCOUNTER — APPOINTMENT (OUTPATIENT)
Dept: PAIN MANAGEMENT | Facility: CLINIC | Age: 63
End: 2023-08-18
Payer: COMMERCIAL

## 2023-08-18 VITALS — BODY MASS INDEX: 28 KG/M2 | HEIGHT: 63 IN | WEIGHT: 158 LBS

## 2023-08-18 DIAGNOSIS — M54.16 RADICULOPATHY, LUMBAR REGION: ICD-10-CM

## 2023-08-18 DIAGNOSIS — M51.26 OTHER INTERVERTEBRAL DISC DISPLACEMENT, LUMBAR REGION: ICD-10-CM

## 2023-08-18 PROCEDURE — 99214 OFFICE O/P EST MOD 30 MIN: CPT

## 2023-08-18 NOTE — PHYSICAL EXAM
[de-identified] : Constitutional; Appears well, no apparent distress\par  Ability to communicate: Normal \par  Respiratory: non-labored breathing\par  Skin: No rash noted\par  Head: Normocephalic, atraumatic\par  Neck: no visible thyroid enlargement\par  Eyes: Extraocular movements intact\par  Neurologic: Alert and oriented x3\par  Psychiatric: normal mood, affect and behavior\par   [] : no ecchymosis

## 2023-08-18 NOTE — DISCUSSION/SUMMARY
[de-identified] : After discussing various treatment options with the patient including but not limited to oral medications, physical therapy, exercise modalities as well as interventional spinal injections, we have decided with the following plan:  - Continue Home exercises, stretching, activity modification, physical therapy, and conservative care. - MRI report and/or images was reviewed and discussed with the patient. - Recommend L4-5 Lumbar Epidural Steroid Injection under fluoroscopic guidance with image. (LEFT)  - The risks, benefits and alternatives of the proposed procedure were explained in detail with the patient. The risks outlined include but are not limited to infection, bleeding, post-dural puncture headache, nerve injury, a temporary increase in pain, failure to resolve symptoms, allergic reaction, symptom recurrence, and possible elevation of blood sugar in diabetics. All questions were answered to patient's apparent satisfaction and he/she verbalized an understanding. - Patient is presenting with acute/sub-acute radicular pain with impairment in ADLs and functionality.  The pain has not responded to conservative care including NSAID therapy and/or physical therapy.  There is no bleeding tendency, unstable medical condition, or systemic infection. - Follow up in 1-2 weeks post injection for re-evaluation. - Recommend Gabapentin 300mg BID. Recommend to titrate up gabapentin slowly - first take one pill at night for 3 days and then increase to twice daily. Pt instructed not to drive or operate heavy machinery while on medications.

## 2023-08-18 NOTE — HISTORY OF PRESENT ILLNESS
[Lower back] : lower back [7] : 7 [9] : 9 [Tingling] : tingling [Intermittent] : intermittent [Household chores] : household chores [Nothing helps with pain getting better] : Nothing helps with pain getting better [Lying in bed] : lying in bed [FreeTextEntry1] : 08/18/2023: s/p LEFT L4-5 L5-S1 TFESI on 08/2/23 with 100% relief and improvement of ADLs. Had no pain for a few days and then it started to return. Pain is now mostly at the left anterior lower leg.   Initial HPI 07/14/23:  Pain started many years ago but worsened over the past few months. Pain is on the LEFT side of the lower back and radiates down the left knee and calf to the toes described as a damian horse pain with associated numbness and tingling. Saw Dr. Whitman who recommended ISIDRO.   MRI Lumbar Spine 6/5/23 independently reviewed: L4-5 central HNP with L5 impingement; L5-S1 HNP with left sided mild stenosis  Conservative Care: has been doing PT  Pain Medications: Mobic PRN  Past Injections: Had ESIs in the past.  Spine surgery: none  Blood thinners: none  [] : no [FreeTextEntry6] : Cramping , numbness  [FreeTextEntry7] : leg and foot  [de-identified] : l mri  [TWNoteComboBox1] : 100%

## 2023-08-22 ENCOUNTER — APPOINTMENT (OUTPATIENT)
Dept: ORTHOPEDIC SURGERY | Facility: CLINIC | Age: 63
End: 2023-08-22

## 2023-09-01 ENCOUNTER — APPOINTMENT (OUTPATIENT)
Dept: CARDIOLOGY | Facility: CLINIC | Age: 63
End: 2023-09-01
Payer: COMMERCIAL

## 2023-09-01 ENCOUNTER — NON-APPOINTMENT (OUTPATIENT)
Age: 63
End: 2023-09-01

## 2023-09-01 VITALS
SYSTOLIC BLOOD PRESSURE: 136 MMHG | WEIGHT: 160 LBS | HEART RATE: 83 BPM | DIASTOLIC BLOOD PRESSURE: 88 MMHG | HEIGHT: 63 IN | BODY MASS INDEX: 28.35 KG/M2 | OXYGEN SATURATION: 97 %

## 2023-09-01 VITALS — DIASTOLIC BLOOD PRESSURE: 84 MMHG | SYSTOLIC BLOOD PRESSURE: 132 MMHG

## 2023-09-01 DIAGNOSIS — R07.89 OTHER CHEST PAIN: ICD-10-CM

## 2023-09-01 PROCEDURE — 99214 OFFICE O/P EST MOD 30 MIN: CPT

## 2023-09-01 PROCEDURE — 93000 ELECTROCARDIOGRAM COMPLETE: CPT

## 2023-09-01 NOTE — DISCUSSION/SUMMARY
[FreeTextEntry1] : 63F  BP better controlled on current regimen  She notes lightheadedness after taking meds Can separate HCTZ and valsartan and stagger meds to AM and PM- pt wants to keep meds as is Encourage hydration  Would avoid restarting atenolol as it caused bradycardia  HLD- Lipids improving on statin, watch closely. Excellent HDL  Some CP but seems to be related to allergies as it happens around this time of year and only occurs while she is in her office. Prior cardiac testing WNL   RV 6M   [EKG obtained to assist in diagnosis and management of assessed problem(s)] : EKG obtained to assist in diagnosis and management of assessed problem(s)

## 2023-09-01 NOTE — HISTORY OF PRESENT ILLNESS
[FreeTextEntry1] : 63F with HTN, HLD who presents for follow up  BP meds adjusted at last visit (valsartan increased from 80 to 160), BP much better controlled per home readings She notes some lightheadedness within a half hour after taking her meds in the AM, lasts about 20-30 minutes when it occurs Occasional chest tightness when she is at work but feels much better when she steps outside. 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 She thinks it is seasonal as it tends to occur in September and March  Had been on atenolol, d/c'ed due to bradycardia to 40s  Never smoker. Denies family hx of CAD. Works as a  for [employer] offices.  ECG: Sinus rhythm at 77 TTE 11/22: Normal EST: 9:00 mins, no ischemic ECG changes  Rosuvastatin 20mg  Valsartan 160mg-HCTZ 12.5mg

## 2023-11-24 ENCOUNTER — NON-APPOINTMENT (OUTPATIENT)
Age: 63
End: 2023-11-24

## 2023-12-30 ENCOUNTER — NON-APPOINTMENT (OUTPATIENT)
Age: 63
End: 2023-12-30

## 2024-01-04 ENCOUNTER — APPOINTMENT (OUTPATIENT)
Dept: FAMILY MEDICINE | Facility: CLINIC | Age: 64
End: 2024-01-04
Payer: COMMERCIAL

## 2024-01-04 VITALS
DIASTOLIC BLOOD PRESSURE: 97 MMHG | OXYGEN SATURATION: 96 % | SYSTOLIC BLOOD PRESSURE: 150 MMHG | BODY MASS INDEX: 28 KG/M2 | HEIGHT: 63 IN | HEART RATE: 84 BPM | TEMPERATURE: 98.7 F | WEIGHT: 158 LBS | RESPIRATION RATE: 16 BRPM

## 2024-01-04 DIAGNOSIS — Z71.89 OTHER SPECIFIED COUNSELING: ICD-10-CM

## 2024-01-04 PROCEDURE — 99214 OFFICE O/P EST MOD 30 MIN: CPT | Mod: 25

## 2024-01-04 RX ORDER — GABAPENTIN 300 MG/1
300 CAPSULE ORAL
Qty: 60 | Refills: 0 | Status: DISCONTINUED | COMMUNITY
Start: 2023-08-18 | End: 2024-01-04

## 2024-01-04 RX ORDER — METHYLPREDNISOLONE 4 MG/1
4 TABLET ORAL
Qty: 1 | Refills: 0 | Status: DISCONTINUED | COMMUNITY
Start: 2023-05-26 | End: 2024-01-04

## 2024-01-04 RX ORDER — POTASSIUM CHLORIDE 750 MG/1
10 CAPSULE, EXTENDED RELEASE ORAL
Qty: 90 | Refills: 1 | Status: DISCONTINUED | COMMUNITY
Start: 2022-09-14 | End: 2024-01-04

## 2024-01-04 NOTE — HISTORY OF PRESENT ILLNESS
[FreeTextEntry1] : here for persistent cough after getting sick w/ a cold over gloria and covid over thanksgiving states today is the first day she is feeling better did not take bp med yet

## 2024-01-04 NOTE — PHYSICAL EXAM
[No Acute Distress] : no acute distress [Normal Sclera/Conjunctiva] : normal sclera/conjunctiva [EOMI] : extraocular movements intact [Normal Outer Ear/Nose] : the outer ears and nose were normal in appearance [No JVD] : no jugular venous distention [Normal] : normal rate, regular rhythm, normal S1 and S2 and no murmur heard [Coordination Grossly Intact] : coordination grossly intact [Normal Affect] : the affect was normal [Alert and Oriented x3] : oriented to person, place, and time [Normal Insight/Judgement] : insight and judgment were intact [de-identified] : injected nasal turbinates, +mucous

## 2024-02-29 ENCOUNTER — LABORATORY RESULT (OUTPATIENT)
Age: 64
End: 2024-02-29

## 2024-02-29 ENCOUNTER — APPOINTMENT (OUTPATIENT)
Dept: FAMILY MEDICINE | Facility: CLINIC | Age: 64
End: 2024-02-29
Payer: COMMERCIAL

## 2024-02-29 VITALS
DIASTOLIC BLOOD PRESSURE: 90 MMHG | RESPIRATION RATE: 16 BRPM | HEIGHT: 63 IN | HEART RATE: 85 BPM | TEMPERATURE: 98.5 F | BODY MASS INDEX: 28 KG/M2 | OXYGEN SATURATION: 99 % | SYSTOLIC BLOOD PRESSURE: 138 MMHG | WEIGHT: 158 LBS

## 2024-02-29 DIAGNOSIS — Z87.09 PERSONAL HISTORY OF OTHER DISEASES OF THE RESPIRATORY SYSTEM: ICD-10-CM

## 2024-02-29 DIAGNOSIS — E05.90 THYROTOXICOSIS, UNSPECIFIED W/OUT THYROTOXIC CRISIS OR STORM: ICD-10-CM

## 2024-02-29 DIAGNOSIS — R06.09 OTHER FORMS OF DYSPNEA: ICD-10-CM

## 2024-02-29 DIAGNOSIS — Z00.00 ENCOUNTER FOR GENERAL ADULT MEDICAL EXAMINATION W/OUT ABNORMAL FINDINGS: ICD-10-CM

## 2024-02-29 DIAGNOSIS — Z87.898 PERSONAL HISTORY OF OTHER SPECIFIED CONDITIONS: ICD-10-CM

## 2024-02-29 DIAGNOSIS — Z01.818 ENCOUNTER FOR OTHER PREPROCEDURAL EXAMINATION: ICD-10-CM

## 2024-02-29 DIAGNOSIS — Z23 ENCOUNTER FOR IMMUNIZATION: ICD-10-CM

## 2024-02-29 DIAGNOSIS — D21.9 BENIGN NEOPLASM OF CONNECTIVE AND OTHER SOFT TISSUE, UNSPECIFIED: ICD-10-CM

## 2024-02-29 DIAGNOSIS — J45.909 UNSPECIFIED ASTHMA, UNCOMPLICATED: ICD-10-CM

## 2024-02-29 DIAGNOSIS — K63.5 POLYP OF COLON: ICD-10-CM

## 2024-02-29 DIAGNOSIS — Z80.0 FAMILY HISTORY OF MALIGNANT NEOPLASM OF DIGESTIVE ORGANS: ICD-10-CM

## 2024-02-29 DIAGNOSIS — E55.9 VITAMIN D DEFICIENCY, UNSPECIFIED: ICD-10-CM

## 2024-02-29 PROCEDURE — 99396 PREV VISIT EST AGE 40-64: CPT | Mod: 25

## 2024-02-29 PROCEDURE — 36415 COLL VENOUS BLD VENIPUNCTURE: CPT

## 2024-02-29 RX ORDER — FLUTICASONE PROPIONATE 50 UG/1
50 SPRAY, METERED NASAL TWICE DAILY
Qty: 1 | Refills: 2 | Status: DISCONTINUED | COMMUNITY
Start: 2024-01-04 | End: 2024-02-29

## 2024-02-29 RX ORDER — PROMETHAZINE HYDROCHLORIDE AND DEXTROMETHORPHAN HYDROBROMIDE ORAL SOLUTION 15; 6.25 MG/5ML; MG/5ML
6.25-15 SOLUTION ORAL
Qty: 1 | Refills: 0 | Status: DISCONTINUED | COMMUNITY
Start: 2024-01-04 | End: 2024-02-29

## 2024-02-29 RX ORDER — MULTIVITAMIN
TABLET ORAL
Refills: 0 | Status: ACTIVE | COMMUNITY

## 2024-02-29 RX ORDER — ROSUVASTATIN CALCIUM 20 MG/1
20 TABLET, FILM COATED ORAL
Qty: 90 | Refills: 1 | Status: ACTIVE | COMMUNITY
Start: 2019-02-15 | End: 1900-01-01

## 2024-02-29 RX ORDER — PREDNISONE 20 MG/1
20 TABLET ORAL TWICE DAILY
Qty: 10 | Refills: 0 | Status: DISCONTINUED | COMMUNITY
Start: 2024-01-04 | End: 2024-02-29

## 2024-02-29 NOTE — HISTORY OF PRESENT ILLNESS
[de-identified] : flu shot 10/23 mvi [FreeTextEntry1] : 63 year old female who presents today for a complete physical exam. 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

## 2024-02-29 NOTE — PHYSICAL EXAM
[No Acute Distress] : no acute distress [Well Developed] : well developed [Well Nourished] : well nourished [Normal Sclera/Conjunctiva] : normal sclera/conjunctiva [Well-Appearing] : well-appearing [EOMI] : extraocular movements intact [PERRL] : pupils equal round and reactive to light [Normal Outer Ear/Nose] : the outer ears and nose were normal in appearance [Normal Oropharynx] : the oropharynx was normal [Normal TMs] : both tympanic membranes were normal [No JVD] : no jugular venous distention [No Lymphadenopathy] : no lymphadenopathy [Supple] : supple [No Respiratory Distress] : no respiratory distress  [Thyroid Normal, No Nodules] : the thyroid was normal and there were no nodules present [No Accessory Muscle Use] : no accessory muscle use [Clear to Auscultation] : lungs were clear to auscultation bilaterally [Normal Rate] : normal rate  [Regular Rhythm] : with a regular rhythm [No Murmur] : no murmur heard [Normal S1, S2] : normal S1 and S2 [No Abdominal Bruit] : a ~M bruit was not heard ~T in the abdomen [No Varicosities] : no varicosities [Pedal Pulses Present] : the pedal pulses are present [No Edema] : there was no peripheral edema [No Palpable Aorta] : no palpable aorta [No Extremity Clubbing/Cyanosis] : no extremity clubbing/cyanosis [Normal Appearance] : normal in appearance [No Nipple Discharge] : no nipple discharge [No Axillary Lymphadenopathy] : no axillary lymphadenopathy [Soft] : abdomen soft [Non Tender] : non-tender [Non-distended] : non-distended [No Masses] : no abdominal mass palpated [Normal Bowel Sounds] : normal bowel sounds [No HSM] : no HSM [Normal Supraclavicular Nodes] : no supraclavicular lymphadenopathy [Normal Axillary Nodes] : no axillary lymphadenopathy [Normal Posterior Cervical Nodes] : no posterior cervical lymphadenopathy [No CVA Tenderness] : no CVA  tenderness [Normal Anterior Cervical Nodes] : no anterior cervical lymphadenopathy [No Spinal Tenderness] : no spinal tenderness [No Joint Swelling] : no joint swelling [No Rash] : no rash [Grossly Normal Strength/Tone] : grossly normal strength/tone [No Focal Deficits] : no focal deficits [Coordination Grossly Intact] : coordination grossly intact [Normal Gait] : normal gait [Normal Affect] : the affect was normal [Alert and Oriented x3] : oriented to person, place, and time [Normal Insight/Judgement] : insight and judgment were intact [de-identified] : trapezius firmness

## 2024-02-29 NOTE — HEALTH RISK ASSESSMENT
[Good] : ~his/her~ current health as good [Fair] :  ~his/her~ mood as fair [Yes] : Yes [2 - 4 times a month (2 pts)] : 2-4 times a month (2 points) [1 or 2 (0 pts)] : 1 or 2 (0 points) [No] : In the past 12 months have you used drugs other than those required for medical reasons? No [Never (0 pts)] : Never (0 points) [1] : 1) Little interest or pleasure doing things for several days (1) [3] : 2) Feeling down, depressed, or hopeless for nearly every day (3) [With Significant Other] : lives with significant other [# of Members in Household ___] :  household currently consist of [unfilled] member(s) [Unemployed] : unemployed [] :  [Feels Safe at Home] : Feels safe at home [# Of Children ___] : has [unfilled] children [Fully functional (bathing, dressing, toileting, transferring, walking, feeding)] : Fully functional (bathing, dressing, toileting, transferring, walking, feeding) [Fully functional (using the telephone, shopping, preparing meals, housekeeping, doing laundry, using] : Fully functional and needs no help or supervision to perform IADLs (using the telephone, shopping, preparing meals, housekeeping, doing laundry, using transportation, managing medications and managing finances) [Never] : Never [de-identified] : walking and exercise [de-identified] : carbs, lean protein, fruits/veggies [Change in mental status noted] : No change in mental status noted [Language] : denies difficulty with language [Handling Complex Tasks] : denies difficulty handling complex tasks [Reports changes in hearing] : Reports no changes in hearing [Reports changes in vision] : Reports no changes in vision [Reports changes in dental health] : Reports no changes in dental health

## 2024-02-29 NOTE — PLAN
[FreeTextEntry1] : chk bw increase valsartan hct to 320/12.5 mg allowed to vent, emotional support provided pt deferring psych f/u or ssri's cont cardio f/u rtc in 2-3 mths

## 2024-03-01 LAB
25(OH)D3 SERPL-MCNC: 40.7 NG/ML
ALBUMIN SERPL ELPH-MCNC: 4.3 G/DL
ALP BLD-CCNC: 92 U/L
ALT SERPL-CCNC: 17 U/L
ANION GAP SERPL CALC-SCNC: 14 MMOL/L
APPEARANCE: CLEAR
AST SERPL-CCNC: 17 U/L
BASOPHILS # BLD AUTO: 0.06 K/UL
BASOPHILS NFR BLD AUTO: 0.9 %
BILIRUB SERPL-MCNC: 0.6 MG/DL
BILIRUBIN URINE: NEGATIVE
BLOOD URINE: NEGATIVE
BUN SERPL-MCNC: 12 MG/DL
CALCIUM SERPL-MCNC: 9.8 MG/DL
CHLORIDE SERPL-SCNC: 98 MMOL/L
CHOLEST SERPL-MCNC: 192 MG/DL
CO2 SERPL-SCNC: 27 MMOL/L
COLOR: YELLOW
CREAT SERPL-MCNC: 0.81 MG/DL
EGFR: 81 ML/MIN/1.73M2
EOSINOPHIL # BLD AUTO: 0.14 K/UL
EOSINOPHIL NFR BLD AUTO: 2.1 %
ESTIMATED AVERAGE GLUCOSE: 105 MG/DL
GLUCOSE QUALITATIVE U: NEGATIVE MG/DL
GLUCOSE SERPL-MCNC: 91 MG/DL
HBA1C MFR BLD HPLC: 5.3 %
HCT VFR BLD CALC: 39.4 %
HDLC SERPL-MCNC: 102 MG/DL
HGB BLD-MCNC: 13.6 G/DL
IMM GRANULOCYTES NFR BLD AUTO: 0.1 %
KETONES URINE: NEGATIVE MG/DL
LDLC SERPL CALC-MCNC: 76 MG/DL
LEUKOCYTE ESTERASE URINE: ABNORMAL
LYMPHOCYTES # BLD AUTO: 2.71 K/UL
LYMPHOCYTES NFR BLD AUTO: 40.6 %
MAN DIFF?: NORMAL
MCHC RBC-ENTMCNC: 30.2 PG
MCHC RBC-ENTMCNC: 34.5 GM/DL
MCV RBC AUTO: 87.6 FL
MONOCYTES # BLD AUTO: 0.45 K/UL
MONOCYTES NFR BLD AUTO: 6.7 %
NEUTROPHILS # BLD AUTO: 3.31 K/UL
NEUTROPHILS NFR BLD AUTO: 49.6 %
NITRITE URINE: NEGATIVE
NONHDLC SERPL-MCNC: 90 MG/DL
PH URINE: 5.5
PLATELET # BLD AUTO: 293 K/UL
POTASSIUM SERPL-SCNC: 3.4 MMOL/L
PROT SERPL-MCNC: 7.4 G/DL
PROTEIN URINE: NORMAL MG/DL
RBC # BLD: 4.5 M/UL
RBC # FLD: 12.2 %
SODIUM SERPL-SCNC: 139 MMOL/L
SPECIFIC GRAVITY URINE: 1.03
T3FREE SERPL-MCNC: 3.25 PG/ML
T4 FREE SERPL-MCNC: 1.2 NG/DL
TRIGL SERPL-MCNC: 80 MG/DL
TSH SERPL-ACNC: 0.79 UIU/ML
UROBILINOGEN URINE: 1 MG/DL
WBC # FLD AUTO: 6.68 K/UL

## 2024-03-02 LAB
CANCER AG125 SERPL-ACNC: 7 U/ML
CANCER AG19-9 SERPL-ACNC: 4 U/ML

## 2024-03-04 LAB
ALPHA-1-FETOPROTEIN-L3: NORMAL %
ALPHA-1-FETOPROTEIN: 4 NG/ML

## 2024-03-08 ENCOUNTER — NON-APPOINTMENT (OUTPATIENT)
Age: 64
End: 2024-03-08

## 2024-03-08 ENCOUNTER — APPOINTMENT (OUTPATIENT)
Dept: CARDIOLOGY | Facility: CLINIC | Age: 64
End: 2024-03-08
Payer: COMMERCIAL

## 2024-03-08 VITALS
BODY MASS INDEX: 28 KG/M2 | OXYGEN SATURATION: 98 % | HEIGHT: 63 IN | SYSTOLIC BLOOD PRESSURE: 133 MMHG | DIASTOLIC BLOOD PRESSURE: 80 MMHG | WEIGHT: 158 LBS | HEART RATE: 68 BPM

## 2024-03-08 VITALS — DIASTOLIC BLOOD PRESSURE: 80 MMHG | SYSTOLIC BLOOD PRESSURE: 122 MMHG

## 2024-03-08 DIAGNOSIS — I10 ESSENTIAL (PRIMARY) HYPERTENSION: ICD-10-CM

## 2024-03-08 DIAGNOSIS — E78.00 PURE HYPERCHOLESTEROLEMIA, UNSPECIFIED: ICD-10-CM

## 2024-03-08 PROCEDURE — G2211 COMPLEX E/M VISIT ADD ON: CPT

## 2024-03-08 PROCEDURE — 93000 ELECTROCARDIOGRAM COMPLETE: CPT

## 2024-03-08 PROCEDURE — 99213 OFFICE O/P EST LOW 20 MIN: CPT

## 2024-03-08 NOTE — DISCUSSION/SUMMARY
[FreeTextEntry1] : 63F  HTN: BP well controlled. Occasionally high diastolic readings. Continue meds as dosed. Valsartan recently increased. Increase dietary K   Would avoid beta blockers as they caused bradycardia  HLD- Lipids improving on statin, watch closely. Excellent HDL  No further cardiac symptoms. Rare dyspnea.   RV 6M     [EKG obtained to assist in diagnosis and management of assessed problem(s)] : EKG obtained to assist in diagnosis and management of assessed problem(s)

## 2024-03-08 NOTE — HISTORY OF PRESENT ILLNESS
[FreeTextEntry1] : 63F with HTN, HLD who presents for follow up  BP had been borderline, valsartan increased by PCP earlier this week  Emotional, her mother in law recently passed away, and he mother just turned 100 She is retiring from work No further lightheadedness No further chest tightness- pt thinks was related to asthma. Had a bout of COVID in late 2023, several weeks of cough, dyspnea, improved with steroids. Still notes occasional dyspnea   Had been on atenolol, d/c'ed due to bradycardia to 40s  Never smoker. Denies family hx of CAD. Retired - Works as a  for [employer] offices.  ECG: Sinus rhythm at 77 TTE 11/22: Normal EST: 9:00 mins, no ischemic ECG changes  Rosuvastatin 20mg Valsartan 320mg-HCTZ 12.5mg

## 2024-03-27 ENCOUNTER — APPOINTMENT (OUTPATIENT)
Dept: FAMILY MEDICINE | Facility: CLINIC | Age: 64
End: 2024-03-27

## 2024-05-16 ENCOUNTER — RESULT REVIEW (OUTPATIENT)
Age: 64
End: 2024-05-16

## 2024-08-06 ENCOUNTER — APPOINTMENT (OUTPATIENT)
Dept: FAMILY MEDICINE | Facility: CLINIC | Age: 64
End: 2024-08-06

## 2024-08-06 PROCEDURE — 99204 OFFICE O/P NEW MOD 45 MIN: CPT

## 2024-08-12 ENCOUNTER — TRANSCRIPTION ENCOUNTER (OUTPATIENT)
Age: 64
End: 2024-08-12

## 2024-08-22 RX ORDER — PRAVASTATIN SODIUM 40 MG/1
40 TABLET ORAL DAILY
Qty: 90 | Refills: 1 | Status: ACTIVE | COMMUNITY
Start: 2024-08-21 | End: 1900-01-01

## 2024-10-15 ENCOUNTER — APPOINTMENT (OUTPATIENT)
Dept: ORTHOPEDIC SURGERY | Facility: CLINIC | Age: 64
End: 2024-10-15
Payer: MEDICARE

## 2024-10-15 VITALS — BODY MASS INDEX: 27.11 KG/M2 | WEIGHT: 153 LBS | HEIGHT: 63 IN

## 2024-10-15 DIAGNOSIS — M19.072 PRIMARY OSTEOARTHRITIS, LEFT ANKLE AND FOOT: ICD-10-CM

## 2024-10-15 DIAGNOSIS — M51.26 OTHER INTERVERTEBRAL DISC DISPLACEMENT, LUMBAR REGION: ICD-10-CM

## 2024-10-15 DIAGNOSIS — M54.16 RADICULOPATHY, LUMBAR REGION: ICD-10-CM

## 2024-10-15 PROCEDURE — 99214 OFFICE O/P EST MOD 30 MIN: CPT

## 2024-10-15 PROCEDURE — 73620 X-RAY EXAM OF FOOT: CPT | Mod: LT

## 2024-10-15 PROCEDURE — 72110 X-RAY EXAM L-2 SPINE 4/>VWS: CPT

## 2024-10-15 RX ORDER — MELOXICAM 15 MG/1
15 TABLET ORAL
Qty: 30 | Refills: 2 | Status: ACTIVE | COMMUNITY
Start: 2024-10-15 | End: 1900-01-01

## 2024-10-31 ENCOUNTER — APPOINTMENT (OUTPATIENT)
Dept: ORTHOPEDIC SURGERY | Facility: CLINIC | Age: 64
End: 2024-10-31
Payer: MEDICARE

## 2024-10-31 DIAGNOSIS — M19.072 PRIMARY OSTEOARTHRITIS, LEFT ANKLE AND FOOT: ICD-10-CM

## 2024-10-31 PROCEDURE — 99213 OFFICE O/P EST LOW 20 MIN: CPT

## 2024-11-01 ENCOUNTER — APPOINTMENT (OUTPATIENT)
Dept: PAIN MANAGEMENT | Facility: CLINIC | Age: 64
End: 2024-11-01
Payer: MEDICARE

## 2024-11-01 VITALS — HEIGHT: 63 IN | BODY MASS INDEX: 27.11 KG/M2 | WEIGHT: 153 LBS

## 2024-11-01 DIAGNOSIS — M51.26 OTHER INTERVERTEBRAL DISC DISPLACEMENT, LUMBAR REGION: ICD-10-CM

## 2024-11-01 DIAGNOSIS — M79.10 MYALGIA, UNSPECIFIED SITE: ICD-10-CM

## 2024-11-01 DIAGNOSIS — M54.16 RADICULOPATHY, LUMBAR REGION: ICD-10-CM

## 2024-11-01 PROCEDURE — 99204 OFFICE O/P NEW MOD 45 MIN: CPT

## 2024-11-19 ENCOUNTER — APPOINTMENT (OUTPATIENT)
Dept: PAIN MANAGEMENT | Facility: CLINIC | Age: 64
End: 2024-11-19
Payer: MEDICARE

## 2024-11-19 DIAGNOSIS — M54.16 RADICULOPATHY, LUMBAR REGION: ICD-10-CM

## 2024-11-19 PROCEDURE — 62323 NJX INTERLAMINAR LMBR/SAC: CPT

## 2024-11-25 ENCOUNTER — APPOINTMENT (OUTPATIENT)
Dept: CARDIOLOGY | Facility: CLINIC | Age: 64
End: 2024-11-25
Payer: MEDICARE

## 2024-11-25 VITALS
SYSTOLIC BLOOD PRESSURE: 120 MMHG | OXYGEN SATURATION: 98 % | DIASTOLIC BLOOD PRESSURE: 78 MMHG | HEIGHT: 63 IN | HEART RATE: 73 BPM | BODY MASS INDEX: 27.11 KG/M2 | WEIGHT: 153 LBS

## 2024-11-25 DIAGNOSIS — I10 ESSENTIAL (PRIMARY) HYPERTENSION: ICD-10-CM

## 2024-11-25 DIAGNOSIS — E78.00 PURE HYPERCHOLESTEROLEMIA, UNSPECIFIED: ICD-10-CM

## 2024-11-25 LAB
ALBUMIN SERPL ELPH-MCNC: 4.2 G/DL
ALP BLD-CCNC: 104 U/L
ALT SERPL-CCNC: 17 U/L
ANION GAP SERPL CALC-SCNC: 11 MMOL/L
AST SERPL-CCNC: 14 U/L
BILIRUB SERPL-MCNC: 0.6 MG/DL
BUN SERPL-MCNC: 13 MG/DL
CALCIUM SERPL-MCNC: 10 MG/DL
CHLORIDE SERPL-SCNC: 97 MMOL/L
CHOLEST SERPL-MCNC: 317 MG/DL
CO2 SERPL-SCNC: 31 MMOL/L
CREAT SERPL-MCNC: 0.82 MG/DL
EGFR: 79 ML/MIN/1.73M2
GLUCOSE SERPL-MCNC: 121 MG/DL
HDLC SERPL-MCNC: 100 MG/DL
LDLC SERPL CALC-MCNC: 196 MG/DL
NONHDLC SERPL-MCNC: 217 MG/DL
POTASSIUM SERPL-SCNC: 3.5 MMOL/L
PROT SERPL-MCNC: 7.4 G/DL
SODIUM SERPL-SCNC: 139 MMOL/L
TRIGL SERPL-MCNC: 120 MG/DL

## 2024-11-25 PROCEDURE — G2211 COMPLEX E/M VISIT ADD ON: CPT

## 2024-11-25 PROCEDURE — 99204 OFFICE O/P NEW MOD 45 MIN: CPT

## 2024-11-26 RX ORDER — PITAVASTATIN CALCIUM 1 MG/1
1 TABLET ORAL
Qty: 90 | Refills: 1 | Status: ACTIVE | COMMUNITY
Start: 2024-11-25 | End: 1900-01-01

## 2024-12-06 ENCOUNTER — APPOINTMENT (OUTPATIENT)
Dept: PAIN MANAGEMENT | Facility: CLINIC | Age: 64
End: 2024-12-06
Payer: MEDICARE

## 2024-12-06 DIAGNOSIS — M51.26 OTHER INTERVERTEBRAL DISC DISPLACEMENT, LUMBAR REGION: ICD-10-CM

## 2024-12-06 DIAGNOSIS — M54.16 RADICULOPATHY, LUMBAR REGION: ICD-10-CM

## 2024-12-06 DIAGNOSIS — M79.10 MYALGIA, UNSPECIFIED SITE: ICD-10-CM

## 2024-12-06 PROCEDURE — 99213 OFFICE O/P EST LOW 20 MIN: CPT

## 2024-12-11 ENCOUNTER — NON-APPOINTMENT (OUTPATIENT)
Age: 64
End: 2024-12-11

## 2025-02-13 ENCOUNTER — APPOINTMENT (OUTPATIENT)
Dept: ORTHOPEDIC SURGERY | Facility: CLINIC | Age: 65
End: 2025-02-13
Payer: MEDICARE

## 2025-02-13 VITALS — HEIGHT: 63 IN | BODY MASS INDEX: 26.58 KG/M2 | WEIGHT: 150 LBS

## 2025-02-13 DIAGNOSIS — M20.11 HALLUX VALGUS (ACQUIRED), RIGHT FOOT: ICD-10-CM

## 2025-02-13 DIAGNOSIS — M19.072 PRIMARY OSTEOARTHRITIS, LEFT ANKLE AND FOOT: ICD-10-CM

## 2025-02-13 DIAGNOSIS — M20.12 HALLUX VALGUS (ACQUIRED), LEFT FOOT: ICD-10-CM

## 2025-02-13 PROCEDURE — 99214 OFFICE O/P EST MOD 30 MIN: CPT | Mod: 57

## 2025-02-13 PROCEDURE — 73630 X-RAY EXAM OF FOOT: CPT | Mod: 50

## 2025-03-06 ENCOUNTER — APPOINTMENT (OUTPATIENT)
Dept: FAMILY MEDICINE | Facility: CLINIC | Age: 65
End: 2025-03-06
Payer: MEDICARE

## 2025-03-06 ENCOUNTER — NON-APPOINTMENT (OUTPATIENT)
Age: 65
End: 2025-03-06

## 2025-03-06 VITALS
BODY MASS INDEX: 27.11 KG/M2 | HEIGHT: 63 IN | WEIGHT: 153.03 LBS | OXYGEN SATURATION: 99 % | RESPIRATION RATE: 16 BRPM | HEART RATE: 62 BPM

## 2025-03-06 VITALS — DIASTOLIC BLOOD PRESSURE: 71 MMHG | SYSTOLIC BLOOD PRESSURE: 119 MMHG

## 2025-03-06 DIAGNOSIS — Z86.39 PERSONAL HISTORY OF OTHER ENDOCRINE, NUTRITIONAL AND METABOLIC DISEASE: ICD-10-CM

## 2025-03-06 DIAGNOSIS — M77.32 CALCANEAL SPUR, LEFT FOOT: ICD-10-CM

## 2025-03-06 DIAGNOSIS — R43.8 OTHER DISTURBANCES OF SMELL AND TASTE: ICD-10-CM

## 2025-03-06 DIAGNOSIS — R07.89 OTHER CHEST PAIN: ICD-10-CM

## 2025-03-06 DIAGNOSIS — Z01.818 ENCOUNTER FOR OTHER PREPROCEDURAL EXAMINATION: ICD-10-CM

## 2025-03-06 DIAGNOSIS — M21.612 BUNION OF LEFT FOOT: ICD-10-CM

## 2025-03-06 DIAGNOSIS — I10 ESSENTIAL (PRIMARY) HYPERTENSION: ICD-10-CM

## 2025-03-06 DIAGNOSIS — R53.81 OTHER MALAISE: ICD-10-CM

## 2025-03-06 DIAGNOSIS — M19.072 PRIMARY OSTEOARTHRITIS, LEFT ANKLE AND FOOT: ICD-10-CM

## 2025-03-06 DIAGNOSIS — Z87.39 PERSONAL HISTORY OF OTHER DISEASES OF THE MUSCULOSKELETAL SYSTEM AND CONNECTIVE TISSUE: ICD-10-CM

## 2025-03-06 DIAGNOSIS — J45.909 UNSPECIFIED ASTHMA, UNCOMPLICATED: ICD-10-CM

## 2025-03-06 DIAGNOSIS — M54.30 SCIATICA, UNSPECIFIED SIDE: ICD-10-CM

## 2025-03-06 DIAGNOSIS — M79.672 PAIN IN LEFT FOOT: ICD-10-CM

## 2025-03-06 DIAGNOSIS — Z86.79 PERSONAL HISTORY OF OTHER DISEASES OF THE CIRCULATORY SYSTEM: ICD-10-CM

## 2025-03-06 DIAGNOSIS — M25.562 PAIN IN LEFT KNEE: ICD-10-CM

## 2025-03-06 PROCEDURE — 36415 COLL VENOUS BLD VENIPUNCTURE: CPT

## 2025-03-06 PROCEDURE — 99214 OFFICE O/P EST MOD 30 MIN: CPT

## 2025-03-06 PROCEDURE — 93000 ELECTROCARDIOGRAM COMPLETE: CPT

## 2025-03-06 RX ORDER — SENNOSIDES 8.6 MG
TABLET ORAL
Refills: 0 | Status: ACTIVE | COMMUNITY

## 2025-03-07 PROBLEM — R53.81 MALAISE: Status: RESOLVED | Noted: 2017-05-15 | Resolved: 2025-03-07

## 2025-03-07 PROBLEM — Z87.39 HISTORY OF MUSCLE PAIN: Status: RESOLVED | Noted: 2024-11-01 | Resolved: 2025-03-07

## 2025-03-07 PROBLEM — R43.8 BITTER TASTE: Status: RESOLVED | Noted: 2023-04-07 | Resolved: 2025-03-07

## 2025-03-07 PROBLEM — M25.562 PAIN IN JOINT INVOLVING LEFT LOWER LEG: Status: RESOLVED | Noted: 2023-05-26 | Resolved: 2025-03-07

## 2025-03-07 PROBLEM — Z86.79 HISTORY OF SINUS BRADYCARDIA: Status: RESOLVED | Noted: 2022-09-14 | Resolved: 2025-03-07

## 2025-03-07 LAB
ALBUMIN SERPL ELPH-MCNC: 4.5 G/DL
ALP BLD-CCNC: 106 U/L
ALT SERPL-CCNC: 15 U/L
ANION GAP SERPL CALC-SCNC: 12 MMOL/L
AST SERPL-CCNC: 18 U/L
BASOPHILS # BLD AUTO: 0.06 K/UL
BASOPHILS NFR BLD AUTO: 0.8 %
BILIRUB SERPL-MCNC: 0.6 MG/DL
BUN SERPL-MCNC: 14 MG/DL
CALCIUM SERPL-MCNC: 9.9 MG/DL
CHLORIDE SERPL-SCNC: 99 MMOL/L
CO2 SERPL-SCNC: 29 MMOL/L
CREAT SERPL-MCNC: 0.78 MG/DL
EGFRCR SERPLBLD CKD-EPI 2021: 84 ML/MIN/1.73M2
EOSINOPHIL # BLD AUTO: 0.16 K/UL
EOSINOPHIL NFR BLD AUTO: 2.2 %
GLUCOSE SERPL-MCNC: 110 MG/DL
HCT VFR BLD CALC: 41.8 %
HGB BLD-MCNC: 13.9 G/DL
IMM GRANULOCYTES NFR BLD AUTO: 0.1 %
INR PPP: 1.01 RATIO
LYMPHOCYTES # BLD AUTO: 2.57 K/UL
LYMPHOCYTES NFR BLD AUTO: 35.9 %
MAN DIFF?: NORMAL
MCHC RBC-ENTMCNC: 29.5 PG
MCHC RBC-ENTMCNC: 33.3 G/DL
MCV RBC AUTO: 88.7 FL
MONOCYTES # BLD AUTO: 0.41 K/UL
MONOCYTES NFR BLD AUTO: 5.7 %
NEUTROPHILS # BLD AUTO: 3.95 K/UL
NEUTROPHILS NFR BLD AUTO: 55.3 %
PLATELET # BLD AUTO: 323 K/UL
POTASSIUM SERPL-SCNC: 3.8 MMOL/L
PROT SERPL-MCNC: 7.5 G/DL
PT BLD: 11.9 SEC
RBC # BLD: 4.71 M/UL
RBC # FLD: 12.3 %
SODIUM SERPL-SCNC: 140 MMOL/L
WBC # FLD AUTO: 7.16 K/UL

## 2025-03-27 RX ORDER — HYDROCODONE BITARTRATE AND ACETAMINOPHEN 5; 325 MG/1; MG/1
5-325 TABLET ORAL
Qty: 20 | Refills: 0 | Status: ACTIVE | COMMUNITY
Start: 2025-03-27 | End: 1900-01-01

## 2025-03-31 ENCOUNTER — APPOINTMENT (OUTPATIENT)
Age: 65
End: 2025-03-31
Payer: MEDICARE

## 2025-03-31 PROCEDURE — 73620 X-RAY EXAM OF FOOT: CPT | Mod: 26

## 2025-03-31 PROCEDURE — 28122 PARTIAL REMOVAL OF FOOT BONE: CPT | Mod: 59,LT

## 2025-03-31 PROCEDURE — 28296 COR HLX VLGS DSTL MTAR OSTEO: CPT | Mod: LT

## 2025-03-31 PROCEDURE — 64450 NJX AA&/STRD OTHER PN/BRANCH: CPT | Mod: LT

## 2025-04-10 ENCOUNTER — APPOINTMENT (OUTPATIENT)
Dept: ORTHOPEDIC SURGERY | Facility: CLINIC | Age: 65
End: 2025-04-10
Payer: MEDICARE

## 2025-04-10 DIAGNOSIS — M19.072 PRIMARY OSTEOARTHRITIS, LEFT ANKLE AND FOOT: ICD-10-CM

## 2025-04-10 DIAGNOSIS — M20.12 HALLUX VALGUS (ACQUIRED), LEFT FOOT: ICD-10-CM

## 2025-04-10 PROCEDURE — 99024 POSTOP FOLLOW-UP VISIT: CPT

## 2025-04-10 PROCEDURE — 73630 X-RAY EXAM OF FOOT: CPT | Mod: LT

## 2025-04-29 ENCOUNTER — APPOINTMENT (OUTPATIENT)
Dept: ORTHOPEDIC SURGERY | Facility: CLINIC | Age: 65
End: 2025-04-29
Payer: MEDICARE

## 2025-04-29 DIAGNOSIS — M20.12 HALLUX VALGUS (ACQUIRED), LEFT FOOT: ICD-10-CM

## 2025-04-29 PROCEDURE — 73630 X-RAY EXAM OF FOOT: CPT | Mod: LT

## 2025-04-29 PROCEDURE — 99024 POSTOP FOLLOW-UP VISIT: CPT

## 2025-05-01 ENCOUNTER — APPOINTMENT (OUTPATIENT)
Dept: CARDIOLOGY | Facility: CLINIC | Age: 65
End: 2025-05-01
Payer: MEDICARE

## 2025-05-01 VITALS
TEMPERATURE: 98 F | SYSTOLIC BLOOD PRESSURE: 136 MMHG | HEIGHT: 66 IN | DIASTOLIC BLOOD PRESSURE: 82 MMHG | HEART RATE: 86 BPM | BODY MASS INDEX: 24.43 KG/M2 | OXYGEN SATURATION: 97 % | WEIGHT: 152 LBS

## 2025-05-01 VITALS — SYSTOLIC BLOOD PRESSURE: 124 MMHG | DIASTOLIC BLOOD PRESSURE: 80 MMHG

## 2025-05-01 DIAGNOSIS — E78.00 PURE HYPERCHOLESTEROLEMIA, UNSPECIFIED: ICD-10-CM

## 2025-05-01 DIAGNOSIS — I10 ESSENTIAL (PRIMARY) HYPERTENSION: ICD-10-CM

## 2025-05-01 PROCEDURE — 99214 OFFICE O/P EST MOD 30 MIN: CPT

## 2025-05-01 PROCEDURE — G2211 COMPLEX E/M VISIT ADD ON: CPT

## 2025-05-20 ENCOUNTER — LABORATORY RESULT (OUTPATIENT)
Age: 65
End: 2025-05-20

## 2025-05-20 ENCOUNTER — APPOINTMENT (OUTPATIENT)
Dept: FAMILY MEDICINE | Facility: CLINIC | Age: 65
End: 2025-05-20
Payer: MEDICARE

## 2025-05-20 VITALS
SYSTOLIC BLOOD PRESSURE: 117 MMHG | OXYGEN SATURATION: 99 % | HEIGHT: 66 IN | HEART RATE: 76 BPM | WEIGHT: 153.25 LBS | DIASTOLIC BLOOD PRESSURE: 78 MMHG | BODY MASS INDEX: 24.63 KG/M2 | TEMPERATURE: 98.4 F | RESPIRATION RATE: 16 BRPM

## 2025-05-20 DIAGNOSIS — Z00.00 ENCOUNTER FOR GENERAL ADULT MEDICAL EXAMINATION W/OUT ABNORMAL FINDINGS: ICD-10-CM

## 2025-05-20 DIAGNOSIS — E78.00 PURE HYPERCHOLESTEROLEMIA, UNSPECIFIED: ICD-10-CM

## 2025-05-20 DIAGNOSIS — E04.1 NONTOXIC SINGLE THYROID NODULE: ICD-10-CM

## 2025-05-20 DIAGNOSIS — I10 ESSENTIAL (PRIMARY) HYPERTENSION: ICD-10-CM

## 2025-05-20 PROCEDURE — 36415 COLL VENOUS BLD VENIPUNCTURE: CPT

## 2025-05-20 PROCEDURE — G0438: CPT

## 2025-05-22 DIAGNOSIS — N28.1 OTHER SPECIFIED DISORDERS OF KIDNEY AND URETER: ICD-10-CM

## 2025-05-22 DIAGNOSIS — N28.89 OTHER SPECIFIED DISORDERS OF KIDNEY AND URETER: ICD-10-CM

## 2025-05-22 LAB
25(OH)D3 SERPL-MCNC: 49.8 NG/ML
ALBUMIN SERPL ELPH-MCNC: 4.6 G/DL
ALP BLD-CCNC: 106 U/L
ALT SERPL-CCNC: 21 U/L
ANION GAP SERPL CALC-SCNC: 18 MMOL/L
APPEARANCE: CLEAR
AST SERPL-CCNC: 18 U/L
BASOPHILS # BLD AUTO: 0.06 K/UL
BASOPHILS NFR BLD AUTO: 0.8 %
BILIRUB SERPL-MCNC: 0.7 MG/DL
BILIRUBIN URINE: NEGATIVE
BLOOD URINE: NEGATIVE
BUN SERPL-MCNC: 12 MG/DL
CALCIUM SERPL-MCNC: 10.4 MG/DL
CHLORIDE SERPL-SCNC: 97 MMOL/L
CHOLEST SERPL-MCNC: 250 MG/DL
CO2 SERPL-SCNC: 26 MMOL/L
COLOR: YELLOW
CREAT SERPL-MCNC: 0.79 MG/DL
EGFRCR SERPLBLD CKD-EPI 2021: 82 ML/MIN/1.73M2
EOSINOPHIL # BLD AUTO: 0.24 K/UL
EOSINOPHIL NFR BLD AUTO: 3.1 %
ESTIMATED AVERAGE GLUCOSE: 103 MG/DL
FOLATE SERPL-MCNC: >20 NG/ML
GLUCOSE QUALITATIVE U: NEGATIVE MG/DL
GLUCOSE SERPL-MCNC: 95 MG/DL
HBA1C MFR BLD HPLC: 5.2 %
HCT VFR BLD CALC: 41.4 %
HDLC SERPL-MCNC: 109 MG/DL
HGB BLD-MCNC: 13.9 G/DL
IMM GRANULOCYTES NFR BLD AUTO: 0.1 %
KETONES URINE: NEGATIVE MG/DL
LDLC SERPL-MCNC: 127 MG/DL
LEUKOCYTE ESTERASE URINE: ABNORMAL
LYMPHOCYTES # BLD AUTO: 3.01 K/UL
LYMPHOCYTES NFR BLD AUTO: 39.2 %
MAGNESIUM SERPL-MCNC: 2.4 MG/DL
MAN DIFF?: NORMAL
MCHC RBC-ENTMCNC: 29.4 PG
MCHC RBC-ENTMCNC: 33.6 G/DL
MCV RBC AUTO: 87.7 FL
MONOCYTES # BLD AUTO: 0.44 K/UL
MONOCYTES NFR BLD AUTO: 5.7 %
NEUTROPHILS # BLD AUTO: 3.91 K/UL
NEUTROPHILS NFR BLD AUTO: 51.1 %
NITRITE URINE: NEGATIVE
NONHDLC SERPL-MCNC: 141 MG/DL
PH URINE: 5.5
PLATELET # BLD AUTO: 356 K/UL
POTASSIUM SERPL-SCNC: 3.5 MMOL/L
PROT SERPL-MCNC: 7.9 G/DL
PROTEIN URINE: NORMAL MG/DL
RBC # BLD: 4.72 M/UL
RBC # FLD: 12.5 %
SODIUM SERPL-SCNC: 141 MMOL/L
SPECIFIC GRAVITY URINE: 1.03
TRIGL SERPL-MCNC: 86 MG/DL
TSH SERPL-ACNC: 1.21 UIU/ML
UROBILINOGEN URINE: 1 MG/DL
VIT B12 SERPL-MCNC: 690 PG/ML
WBC # FLD AUTO: 7.67 K/UL

## 2025-06-17 ENCOUNTER — APPOINTMENT (OUTPATIENT)
Dept: ORTHOPEDIC SURGERY | Facility: CLINIC | Age: 65
End: 2025-06-17
Payer: MEDICARE

## 2025-06-17 PROCEDURE — 99024 POSTOP FOLLOW-UP VISIT: CPT

## 2025-06-19 ENCOUNTER — APPOINTMENT (OUTPATIENT)
Dept: UROLOGY | Facility: CLINIC | Age: 65
End: 2025-06-19
Payer: MEDICARE

## 2025-06-19 VITALS
WEIGHT: 148 LBS | OXYGEN SATURATION: 97 % | DIASTOLIC BLOOD PRESSURE: 83 MMHG | HEIGHT: 63 IN | BODY MASS INDEX: 26.22 KG/M2 | RESPIRATION RATE: 16 BRPM | HEART RATE: 72 BPM | SYSTOLIC BLOOD PRESSURE: 141 MMHG

## 2025-06-19 PROBLEM — Z80.59 FAMILY HISTORY OF MALIGNANT NEOPLASM OF URETHRA: Status: ACTIVE | Noted: 2025-06-19

## 2025-06-19 PROBLEM — Z87.09 HISTORY OF ASTHMA: Status: RESOLVED | Noted: 2025-06-19 | Resolved: 2025-06-19

## 2025-06-19 PROBLEM — Z80.42 FAMILY HISTORY OF PROSTATE CANCER: Status: ACTIVE | Noted: 2025-06-19

## 2025-06-19 PROBLEM — N28.9 RENAL LESION: Status: ACTIVE | Noted: 2025-06-19

## 2025-06-19 PROBLEM — Z80.0 FAMILY HISTORY OF MALIGNANT NEOPLASM OF STOMACH: Status: ACTIVE | Noted: 2025-06-19

## 2025-06-19 PROBLEM — Z86.79 HISTORY OF HYPERTENSION: Status: RESOLVED | Noted: 2025-06-19 | Resolved: 2025-06-19

## 2025-06-19 PROCEDURE — 99204 OFFICE O/P NEW MOD 45 MIN: CPT

## 2025-08-05 ENCOUNTER — APPOINTMENT (OUTPATIENT)
Dept: ORTHOPEDIC SURGERY | Facility: CLINIC | Age: 65
End: 2025-08-05
Payer: MEDICARE

## 2025-08-05 DIAGNOSIS — M19.072 PRIMARY OSTEOARTHRITIS, LEFT ANKLE AND FOOT: ICD-10-CM

## 2025-08-05 DIAGNOSIS — M20.11 HALLUX VALGUS (ACQUIRED), RIGHT FOOT: ICD-10-CM

## 2025-08-05 PROCEDURE — 99213 OFFICE O/P EST LOW 20 MIN: CPT

## 2025-08-05 RX ORDER — LIDOCAINE 5% 700 MG/1
5 PATCH TOPICAL
Qty: 30 | Refills: 2 | Status: ACTIVE | COMMUNITY
Start: 2025-08-05 | End: 1900-01-01

## 2025-08-06 ENCOUNTER — RX RENEWAL (OUTPATIENT)
Age: 65
End: 2025-08-06